# Patient Record
Sex: FEMALE | Race: WHITE | HISPANIC OR LATINO | Employment: FULL TIME | URBAN - METROPOLITAN AREA
[De-identification: names, ages, dates, MRNs, and addresses within clinical notes are randomized per-mention and may not be internally consistent; named-entity substitution may affect disease eponyms.]

---

## 2021-04-23 ENCOUNTER — OCCMED (OUTPATIENT)
Dept: URGENT CARE | Facility: CLINIC | Age: 31
End: 2021-04-23

## 2021-04-23 ENCOUNTER — APPOINTMENT (OUTPATIENT)
Dept: LAB | Facility: CLINIC | Age: 31
End: 2021-04-23

## 2021-04-23 DIAGNOSIS — Z02.1 ENCOUNTER FOR PRE-EMPLOYMENT HEALTH SCREENING EXAMINATION: ICD-10-CM

## 2021-04-23 DIAGNOSIS — Z02.1 ENCOUNTER FOR PRE-EMPLOYMENT HEALTH SCREENING EXAMINATION: Primary | ICD-10-CM

## 2021-04-23 LAB — RUBV IGG SERPL IA-ACNC: 11.6 IU/ML

## 2021-04-23 PROCEDURE — 86480 TB TEST CELL IMMUN MEASURE: CPT

## 2021-04-23 PROCEDURE — 86765 RUBEOLA ANTIBODY: CPT

## 2021-04-23 PROCEDURE — 86762 RUBELLA ANTIBODY: CPT

## 2021-04-23 PROCEDURE — 36415 COLL VENOUS BLD VENIPUNCTURE: CPT

## 2021-04-23 PROCEDURE — 86735 MUMPS ANTIBODY: CPT

## 2021-04-23 PROCEDURE — 86787 VARICELLA-ZOSTER ANTIBODY: CPT

## 2021-04-26 LAB
GAMMA INTERFERON BACKGROUND BLD IA-ACNC: 0.03 IU/ML
M TB IFN-G BLD-IMP: NEGATIVE
M TB IFN-G CD4+ BCKGRND COR BLD-ACNC: 0 IU/ML
M TB IFN-G CD4+ BCKGRND COR BLD-ACNC: 0 IU/ML
MITOGEN IGNF BCKGRD COR BLD-ACNC: >10 IU/ML

## 2021-04-27 LAB
MEV IGG SER QL: NORMAL
MUV IGG SER QL: NORMAL
VZV IGG SER IA-ACNC: NORMAL

## 2021-06-08 ENCOUNTER — APPOINTMENT (EMERGENCY)
Dept: CT IMAGING | Facility: HOSPITAL | Age: 31
End: 2021-06-08
Payer: COMMERCIAL

## 2021-06-08 ENCOUNTER — OFFICE VISIT (OUTPATIENT)
Dept: URGENT CARE | Age: 31
End: 2021-06-08
Payer: COMMERCIAL

## 2021-06-08 ENCOUNTER — HOSPITAL ENCOUNTER (EMERGENCY)
Facility: HOSPITAL | Age: 31
Discharge: HOME | End: 2021-06-08
Attending: EMERGENCY MEDICINE
Payer: COMMERCIAL

## 2021-06-08 VITALS
BODY MASS INDEX: 29.56 KG/M2 | HEART RATE: 67 BPM | DIASTOLIC BLOOD PRESSURE: 73 MMHG | RESPIRATION RATE: 18 BRPM | OXYGEN SATURATION: 100 % | WEIGHT: 161.6 LBS | TEMPERATURE: 98.4 F | SYSTOLIC BLOOD PRESSURE: 106 MMHG

## 2021-06-08 VITALS
OXYGEN SATURATION: 100 % | BODY MASS INDEX: 30 KG/M2 | TEMPERATURE: 98 F | HEART RATE: 74 BPM | WEIGHT: 163 LBS | RESPIRATION RATE: 18 BRPM | HEIGHT: 62 IN

## 2021-06-08 DIAGNOSIS — R10.10 PAIN OF UPPER ABDOMEN: Primary | ICD-10-CM

## 2021-06-08 DIAGNOSIS — R10.32 LEFT LOWER QUADRANT ABDOMINAL PAIN: Primary | ICD-10-CM

## 2021-06-08 LAB
ALBUMIN SERPL BCP-MCNC: 3.9 G/DL (ref 3.5–5)
ALP SERPL-CCNC: 177 U/L (ref 46–116)
ALT SERPL W P-5'-P-CCNC: 33 U/L (ref 12–78)
ANION GAP SERPL CALCULATED.3IONS-SCNC: 7 MMOL/L (ref 4–13)
AST SERPL W P-5'-P-CCNC: 14 U/L (ref 5–45)
BASOPHILS # BLD AUTO: 0.05 THOUSANDS/ΜL (ref 0–0.1)
BASOPHILS NFR BLD AUTO: 0 % (ref 0–1)
BILIRUB SERPL-MCNC: 0.81 MG/DL (ref 0.2–1)
BILIRUB UR QL STRIP: NEGATIVE
BUN SERPL-MCNC: 13 MG/DL (ref 5–25)
CALCIUM SERPL-MCNC: 9.3 MG/DL (ref 8.3–10.1)
CHLORIDE SERPL-SCNC: 104 MMOL/L (ref 100–108)
CLARITY UR: CLEAR
CO2 SERPL-SCNC: 30 MMOL/L (ref 21–32)
COLOR UR: YELLOW
CREAT SERPL-MCNC: 0.75 MG/DL (ref 0.6–1.3)
EOSINOPHIL # BLD AUTO: 0.06 THOUSAND/ΜL (ref 0–0.61)
EOSINOPHIL NFR BLD AUTO: 1 % (ref 0–6)
ERYTHROCYTE [DISTWIDTH] IN BLOOD BY AUTOMATED COUNT: 14.7 % (ref 11.6–15.1)
EXT PREG TEST URINE: NORMAL
EXT. CONTROL ED NAV: NORMAL
GFR SERPL CREATININE-BSD FRML MDRD: 106 ML/MIN/1.73SQ M
GLUCOSE SERPL-MCNC: 88 MG/DL (ref 65–140)
GLUCOSE UR STRIP-MCNC: NEGATIVE MG/DL
HCT VFR BLD AUTO: 41.2 % (ref 34.8–46.1)
HGB BLD-MCNC: 12.9 G/DL (ref 11.5–15.4)
HGB UR QL STRIP.AUTO: NEGATIVE
IMM GRANULOCYTES # BLD AUTO: 0.05 THOUSAND/UL (ref 0–0.2)
IMM GRANULOCYTES NFR BLD AUTO: 0 % (ref 0–2)
KETONES UR STRIP-MCNC: ABNORMAL MG/DL
LEUKOCYTE ESTERASE UR QL STRIP: NEGATIVE
LIPASE SERPL-CCNC: 107 U/L (ref 73–393)
LYMPHOCYTES # BLD AUTO: 2.19 THOUSANDS/ΜL (ref 0.6–4.47)
LYMPHOCYTES NFR BLD AUTO: 19 % (ref 14–44)
MCH RBC QN AUTO: 27.2 PG (ref 26.8–34.3)
MCHC RBC AUTO-ENTMCNC: 31.3 G/DL (ref 31.4–37.4)
MCV RBC AUTO: 87 FL (ref 82–98)
MONOCYTES # BLD AUTO: 0.5 THOUSAND/ΜL (ref 0.17–1.22)
MONOCYTES NFR BLD AUTO: 4 % (ref 4–12)
NEUTROPHILS # BLD AUTO: 8.71 THOUSANDS/ΜL (ref 1.85–7.62)
NEUTS SEG NFR BLD AUTO: 76 % (ref 43–75)
NITRITE UR QL STRIP: NEGATIVE
NRBC BLD AUTO-RTO: 0 /100 WBCS
PH UR STRIP.AUTO: 6 [PH] (ref 4.5–8)
PLATELET # BLD AUTO: 271 THOUSANDS/UL (ref 149–390)
PMV BLD AUTO: 10.5 FL (ref 8.9–12.7)
POTASSIUM SERPL-SCNC: 4.1 MMOL/L (ref 3.5–5.3)
PROT SERPL-MCNC: 8.3 G/DL (ref 6.4–8.2)
PROT UR STRIP-MCNC: NEGATIVE MG/DL
RBC # BLD AUTO: 4.74 MILLION/UL (ref 3.81–5.12)
SODIUM SERPL-SCNC: 141 MMOL/L (ref 136–145)
SP GR UR STRIP.AUTO: 1.02 (ref 1–1.03)
UROBILINOGEN UR QL STRIP.AUTO: 0.2 E.U./DL
WBC # BLD AUTO: 11.56 THOUSAND/UL (ref 4.31–10.16)

## 2021-06-08 PROCEDURE — 81003 URINALYSIS AUTO W/O SCOPE: CPT

## 2021-06-08 PROCEDURE — 99284 EMERGENCY DEPT VISIT MOD MDM: CPT

## 2021-06-08 PROCEDURE — 99284 EMERGENCY DEPT VISIT MOD MDM: CPT | Performed by: EMERGENCY MEDICINE

## 2021-06-08 PROCEDURE — 96361 HYDRATE IV INFUSION ADD-ON: CPT

## 2021-06-08 PROCEDURE — 36415 COLL VENOUS BLD VENIPUNCTURE: CPT | Performed by: EMERGENCY MEDICINE

## 2021-06-08 PROCEDURE — 74177 CT ABD & PELVIS W/CONTRAST: CPT

## 2021-06-08 PROCEDURE — 81025 URINE PREGNANCY TEST: CPT | Performed by: EMERGENCY MEDICINE

## 2021-06-08 PROCEDURE — 80053 COMPREHEN METABOLIC PANEL: CPT | Performed by: EMERGENCY MEDICINE

## 2021-06-08 PROCEDURE — G0382 LEV 3 HOSP TYPE B ED VISIT: HCPCS | Performed by: PHYSICIAN ASSISTANT

## 2021-06-08 PROCEDURE — 83690 ASSAY OF LIPASE: CPT | Performed by: EMERGENCY MEDICINE

## 2021-06-08 PROCEDURE — 85025 COMPLETE CBC W/AUTO DIFF WBC: CPT | Performed by: EMERGENCY MEDICINE

## 2021-06-08 PROCEDURE — 96374 THER/PROPH/DIAG INJ IV PUSH: CPT

## 2021-06-08 RX ORDER — ACETAMINOPHEN AND CODEINE PHOSPHATE 120; 12 MG/5ML; MG/5ML
0.35 SOLUTION ORAL DAILY
COMMUNITY
Start: 2020-11-13 | End: 2022-04-29

## 2021-06-08 RX ORDER — KETOROLAC TROMETHAMINE 30 MG/ML
15 INJECTION, SOLUTION INTRAMUSCULAR; INTRAVENOUS ONCE
Status: COMPLETED | OUTPATIENT
Start: 2021-06-08 | End: 2021-06-08

## 2021-06-08 RX ORDER — DICYCLOMINE HYDROCHLORIDE 10 MG/1
10 CAPSULE ORAL ONCE
Status: COMPLETED | OUTPATIENT
Start: 2021-06-08 | End: 2021-06-08

## 2021-06-08 RX ADMIN — DICYCLOMINE HYDROCHLORIDE 10 MG: 10 CAPSULE ORAL at 16:24

## 2021-06-08 RX ADMIN — SODIUM CHLORIDE 1000 ML: 0.9 INJECTION, SOLUTION INTRAVENOUS at 16:25

## 2021-06-08 RX ADMIN — IOHEXOL 100 ML: 350 INJECTION, SOLUTION INTRAVENOUS at 17:15

## 2021-06-08 RX ADMIN — KETOROLAC TROMETHAMINE 15 MG: 30 INJECTION, SOLUTION INTRAMUSCULAR; INTRAVENOUS at 16:24

## 2021-06-08 NOTE — Clinical Note
Anamyesha Potter was seen and treated in our emergency department on 6/8/2021  Diagnosis:     Janelle Leal    She may return on this date: 06/09/2021         If you have any questions or concerns, please don't hesitate to call        Chucky Morales MD    ______________________________           _______________          _______________  Hospital Representative                              Date                                Time

## 2021-06-08 NOTE — PROGRESS NOTES
[address] [facility]        NAME: Evelin Seymour is a 27 y o  female  : 1990    MRN: [id number]  DATE: 2021  TIME: 3:18 PM    Assessment and Plan   Left lower quadrant abdominal pain [R10 32]  1  Left lower quadrant abdominal pain  Transfer to other facility   Pt with severe epigastric pain and generalized tenderness worse to right lower quadrant  Discussed with patient that I would recommend further evaluation in the emergency department  Pt understands  Her VSS are stable and though she is anxious is in no acute distress and is non-toxic, safe to drive self to ED  Pt would prefer [facility]  Transfer orders placed  Patient Instructions     Proceed to the emergency department for further evaluation  Chief Complaint     Chief Complaint   Patient presents with    Abdominal Pain     pt started about 3 weeks ago with nausea and loose stools  then it went away  pt states the same thing happenend earlier today while at work  she made herself vomit and then she felt better  History of Present Illness       27year old female presents with severe epigastric pain and diarrhea  Pt reports stabbing pain in her epigastric area that began this morning, she has also had diarrhea since that time  Pt reports symptoms were improved when she self-induced vomiting  She states before inducing vomiting her abdomen was very tight and distended  Pt had similar symptoms 3 weeks ago and reports at that time she was at work and a coworker commented that she appeared very pale and sweaty  Pt denies fever, chills  She has gallbladder and appendix present  Denies possibility of pregnancy, reports C-sections previously  No other concerns or complaints today  Review of Systems   Review of Systems   Constitutional: Positive for diaphoresis  Negative for chills and fever  Gastrointestinal: Positive for abdominal distention, abdominal pain, anal bleeding and diarrhea  Negative for blood in stool  Current Medications       Current Outpatient Medications:     norethindrone (MICRONOR) 0 35 MG tablet, Take 0 35 mg by mouth daily, Disp: , Rfl:     Current Allergies     Allergies as of 06/08/2021    (No Known Allergies)            The following portions of the patient's history were reviewed and updated as appropriate: allergies, current medications, past family history, past medical history, past social history, past surgical history and problem list      No past medical history on file  No past surgical history on file  No family history on file  Medications have been verified  Objective   Pulse 74   Temp 98 °F (36 7 °C)   Resp 18   Ht 5' 2" (1 575 m)   Wt 73 9 kg (163 lb)   SpO2 100%   BMI 29 81 kg/m²   No LMP recorded  Physical Exam     Physical Exam  Vitals signs and nursing note reviewed  Constitutional:       General: She is awake  She is not in acute distress  Appearance: Normal appearance  She is well-developed and well-groomed  She is not ill-appearing, toxic-appearing or diaphoretic  HENT:      Head: Normocephalic and atraumatic  Right Ear: Hearing and external ear normal       Left Ear: Hearing and external ear normal    Eyes:      General: Lids are normal  Vision grossly intact  Gaze aligned appropriately  Neck:      Musculoskeletal: Normal range of motion  Cardiovascular:      Rate and Rhythm: Normal rate and regular rhythm  Heart sounds: Normal heart sounds, S1 normal and S2 normal  Heart sounds not distant  No murmur  No friction rub  No gallop  Pulmonary:      Effort: Pulmonary effort is normal       Breath sounds: Normal breath sounds  No decreased breath sounds, wheezing, rhonchi or rales  Comments: Patient is speaking in full sentences with no increased respiratory effort  No audible wheezing or stridor  Abdominal:      General: Abdomen is flat  Bowel sounds are normal       Palpations: Abdomen is soft        Tenderness: There is generalized abdominal tenderness (worst to RLQ)  There is guarding  There is no rebound  Positive signs include McBurney's sign  Negative signs include Potter's sign, Rovsing's sign, psoas sign and obturator sign  Skin:     General: Skin is warm and dry  Neurological:      Mental Status: She is alert and oriented to person, place, and time  Coordination: Coordination is intact  Gait: Gait is intact  Psychiatric:         Attention and Perception: Attention and perception normal          Mood and Affect: Mood and affect normal          Speech: Speech normal          Behavior: Behavior normal  Behavior is cooperative

## 2021-06-08 NOTE — ED PROVIDER NOTES
Final Diagnosis:  1  Pain of upper abdomen        Chief Complaint   Patient presents with    Abdominal Pain     Worsening RLQ pain  Loose stools, n/v  Sent by Urgent Care for further evaluation  Denies taking anything for pain  HPI  Patient presents for evaluation of abdominal pain  Patient states that earlier today at approximately 8:00 a m  She had the sudden onset of epigastric abdominal pain  She then had multiple episodes of loose stools, nonbloody  She also self induced vomiting and then had many episodes of vomiting which were nonbloody, stomach contents  This improved her pain slightly  She then presented to an outside facility for evaluation  There on exam she had epigastric and right lower quadrant abdominal pain  She was then sent into the emergency department for evaluation of possible appendicitis  Patient is otherwise healthy and takes no medications  She states that she had a similar episode approximately 3 weeks ago when she had the onset of epigastric pain  She stated that she felt hot and sweaty when this occurred  She had multiple episodes of loose stools as well and the feeling subsided over 4-6 hours  Denies any history of abdominal surgery  She did have a  section last July  No fever chills, nausea or vomiting, change in bowel habits, dysuria prior  Unless otherwise stated - No exacerbating or relieving factors, no radiation of pain to back/chest/legs  Able to tolerate PO  Still passing gas/stools  No recent travel or sick contacts  Denies nausea/vomiting, constipation, diarrhea, dysuria, hematuria, melana, or dark colored stools  No pulsatile abdominal mass to suggest AAA  No Point RLQ tenderness to suggest appy  No pain out of proportion to suggest ischemic colitis  No reported chest pain, pleuritic chest pain or shortness of breath  No reported pulmonary symptoms  No tachypnea  No suprapubic or CVA tenderness  No fever/ruq pain/jaundice  Unless otherwise stated - No exacerbating or relieving factors, no radiation of pain to back/chest/legs  Able to tolerate PO  Still passing gas/stools  No recent travel or sick contacts  Denies nausea/vomiting, constipation, diarrhea, dysuria, hematuria, melana, or dark colored stools  No pulsatile abdominal mass to suggest AAA  No Point RLQ tenderness to suggest appy  No pain out of proportion to suggest ischemic colitis  No reported chest pain, pleuritic chest pain or shortness of breath  No reported pulmonary symptoms  No tachypnea  No suprapubic or CVA tenderness  No fever/ruq pain/jaundice        - No language barrier    - History obtained from patient  - There are no limitations to the history obtained  - Previous charting was reviewed    PMH:   has no past medical history on file  PSH:   has no past surgical history on file  ROS:  Review of Systems   Constitutional: Negative for chills, diaphoresis, fatigue and fever  Respiratory: Negative for cough and shortness of breath  Cardiovascular: Negative for chest pain and palpitations  Gastrointestinal: Positive for abdominal pain, diarrhea and vomiting  Negative for abdominal distention, constipation and nausea  Genitourinary: Negative for dysuria, frequency and hematuria  Musculoskeletal: Negative for arthralgias, myalgias and neck pain  Neurological: Negative for dizziness, syncope, light-headedness and headaches  All other systems reviewed and are negative  PE:   Vitals:    06/08/21 1543 06/08/21 1545 06/08/21 1647   BP:  142/67 106/73   BP Location:  Right arm Left arm   Pulse:  78 67   Resp:  18 18   Temp:  98 4 °F (36 9 °C)    TempSrc:  Oral    SpO2:  99% 100%   Weight: 73 3 kg (161 lb 9 6 oz)       Vitals reviewed by me  Physical Exam  Vitals signs reviewed  Constitutional:       General: She is not in acute distress  Appearance: She is not diaphoretic     HENT:      Head: Normocephalic and atraumatic  Right Ear: External ear normal       Left Ear: External ear normal    Eyes:      General: No scleral icterus  Right eye: No discharge  Left eye: No discharge  Conjunctiva/sclera: Conjunctivae normal       Pupils: Pupils are equal, round, and reactive to light  Neck:      Musculoskeletal: Normal range of motion and neck supple  Vascular: No JVD  Cardiovascular:      Rate and Rhythm: Normal rate and regular rhythm  Heart sounds: Normal heart sounds  No murmur  No friction rub  No gallop  Pulmonary:      Effort: Pulmonary effort is normal  No respiratory distress  Breath sounds: Normal breath sounds  No wheezing or rales  Abdominal:      General: Bowel sounds are normal  There is no distension  Palpations: Abdomen is soft  There is no mass  Tenderness: There is abdominal tenderness in the right lower quadrant and epigastric area  There is no right CVA tenderness, left CVA tenderness, guarding or rebound  Negative signs include Potter's sign  Musculoskeletal: Normal range of motion  General: No tenderness or deformity  Skin:     General: Skin is warm  Neurological:      Mental Status: She is alert and oriented to person, place, and time  Cranial Nerves: No cranial nerve deficit  Sensory: No sensory deficit  Motor: No abnormal muscle tone  Coordination: Coordination normal    Psychiatric:         Behavior: Behavior normal          Thought Content: Thought content normal          Judgment: Judgment normal           A:  - Nursing note reviewed  -                      CT Abdomen pelvis with contrast   Final Result      No acute inflammatory changes in the abdomen or pelvis              Workstation performed: [id number]           Orders Placed This Encounter   Procedures    CT Abdomen pelvis with contrast    CBC and differential    CMP    Lipase    Ambulatory referral to Gastroenterology    Urine dip analyzer  Insert peripheral IV    POCT pregnancy, urine     Labs Reviewed   CBC AND DIFFERENTIAL - Abnormal       Result Value Ref Range Status    WBC 11 56 (*) 4 31 - 10 16 Thousand/uL Final    RBC 4 74  3 81 - 5 12 Million/uL Final    Hemoglobin 12 9  11 5 - 15 4 g/dL Final    Hematocrit 41 2  34 8 - 46 1 % Final    MCV 87  82 - 98 fL Final    MCH 27 2  26 8 - 34 3 pg Final    MCHC 31 3 (*) 31 4 - 37 4 g/dL Final    RDW 14 7  11 6 - 15 1 % Final    MPV 10 5  8 9 - 12 7 fL Final    Platelets 976  559 - 390 Thousands/uL Final    nRBC 0  /100 WBCs Final    Neutrophils Relative 76 (*) 43 - 75 % Final    Immat GRANS % 0  0 - 2 % Final    Lymphocytes Relative 19  14 - 44 % Final    Monocytes Relative 4  4 - 12 % Final    Eosinophils Relative 1  0 - 6 % Final    Basophils Relative 0  0 - 1 % Final    Neutrophils Absolute 8 71 (*) 1 85 - 7 62 Thousands/µL Final    Immature Grans Absolute 0 05  0 00 - 0 20 Thousand/uL Final    Lymphocytes Absolute 2 19  0 60 - 4 47 Thousands/µL Final    Monocytes Absolute 0 50  0 17 - 1 22 Thousand/µL Final    Eosinophils Absolute 0 06  0 00 - 0 61 Thousand/µL Final    Basophils Absolute 0 05  0 00 - 0 10 Thousands/µL Final   COMPREHENSIVE METABOLIC PANEL - Abnormal    Sodium 141  136 - 145 mmol/L Final    Potassium 4 1  3 5 - 5 3 mmol/L Final    Chloride 104  100 - 108 mmol/L Final    CO2 30  21 - 32 mmol/L Final    ANION GAP 7  4 - 13 mmol/L Final    BUN 13  5 - 25 mg/dL Final    Creatinine 0 75  0 60 - 1 30 mg/dL Final    Comment: Standardized to IDMS reference method    Glucose 88  65 - 140 mg/dL Final    Comment: If the patient is fasting, the ADA then defines impaired fasting glucose as > 100 mg/dL and diabetes as > or equal to 123 mg/dL  Specimen collection should occur prior to Sulfasalazine administration due to the potential for falsely depressed results  Specimen collection should occur prior to Sulfapyridine administration due to the potential for falsely elevated results  Calcium 9 3  8 3 - 10 1 mg/dL Final    AST 14  5 - 45 U/L Final    Comment: Specimen collection should occur prior to Sulfasalazine administration due to the potential for falsely depressed results  ALT 33  12 - 78 U/L Final    Comment: Specimen collection should occur prior to Sulfasalazine administration due to the potential for falsely depressed results  Alkaline Phosphatase 177 (*) 46 - 116 U/L Final    Total Protein 8 3 (*) 6 4 - 8 2 g/dL Final    Albumin 3 9  3 5 - 5 0 g/dL Final    Total Bilirubin 0 81  0 20 - 1 00 mg/dL Final    Comment: Use of this assay is not recommended for patients undergoing treatment with eltrombopag due to the potential for falsely elevated results      eGFR 106  ml/min/1 73sq m Final    Narrative:     National Kidney Disease Foundation guidelines for Chronic Kidney Disease (CKD):     Stage 1 with normal or high GFR (GFR > 90 mL/min/1 73 square meters)    Stage 2 Mild CKD (GFR = 60-89 mL/min/1 73 square meters)    Stage 3A Moderate CKD (GFR = 45-59 mL/min/1 73 square meters)    Stage 3B Moderate CKD (GFR = 30-44 mL/min/1 73 square meters)    Stage 4 Severe CKD (GFR = 15-29 mL/min/1 73 square meters)    Stage 5 End Stage CKD (GFR <15 mL/min/1 73 square meters)  Note: GFR calculation is accurate only with a steady state creatinine   URINE MACROSCOPIC, POC - Abnormal    Color, UA Yellow   Final    Clarity, UA Clear   Final    pH, UA 6 0  4 5 - 8 0 Final    Leukocytes, UA Negative  Negative Final    Nitrite, UA Negative  Negative Final    Protein, UA Negative  Negative mg/dl Final    Glucose, UA Negative  Negative mg/dl Final    Ketones, UA 15 (1+) (*) Negative mg/dl Final    Urobilinogen, UA 0 2  0 2, 1 0 E U /dl E U /dl Final    Bilirubin, UA Negative  Negative Final    Blood, UA Negative  Negative Final    Specific Gravity, UA 1 025  1 003 - 1 030 Final    Narrative:     CLINITEK RESULT   LIPASE - Normal    Lipase 107  73 - 393 u/L Final   POCT PREGNANCY, URINE - Normal EXT PREG TEST UR (Ref: Negative) Negative (-)   Final    Control Valid   Final         Final Diagnosis:  1  Pain of upper abdomen        P:  - CBC, CMP, lipase, CT scan of abdomen pelvis  -no acute findings on laboratory analysis or on imaging  -I discussed with patient that she should follow up with primary care provision and may need to see gastroenterology for further evaluation of her symptoms should they continue to recur  Provided information for both  Return precautions discussed  Medications   dicyclomine (BENTYL) capsule 10 mg (10 mg Oral Given 6/8/21 1624)   ketorolac (TORADOL) injection 15 mg (15 mg Intravenous Given 6/8/21 1624)   sodium chloride 0 9 % bolus 1,000 mL (0 mL Intravenous Stopped 6/8/21 1736)   iohexol (OMNIPAQUE) 350 MG/ML injection (SINGLE-DOSE) 100 mL (100 mL Intravenous Given 6/8/21 1715)     Time reflects when diagnosis was documented in both MDM as applicable and the Disposition within this note     Time User Action Codes Description Comment    6/8/2021  5:31 PM Lala White [facility] Add [R10 10] Pain of upper abdomen       ED Disposition     ED Disposition Condition Date/Time Comment    Discharge Stable Tue Jun 8, 2021  5:31 PM Flor Potter discharge to home/self care  Follow-up Information     Follow up With Specialties Details Why Contact Info    [employer]   Establish [phone]          Patient's Medications   Discharge Prescriptions    No medications on file       Prior to Admission Medications   Prescriptions Last Dose Informant Patient Reported? Taking?   norethindrone (MICRONOR) 0 35 MG tablet   Yes No   Sig: Take 0 35 mg by mouth daily      Facility-Administered Medications: None       Portions of the record may have been created with voice recognition software  Occasional wrong word or "sound a like" substitutions may have occurred due to the inherent limitations of voice recognition software   Read the chart carefully and recognize, using context, where substitutions have occurred      Electronically signed by:  Collette Restrepo, PGY 3, MD Gaudencio Muller MD  06/08/21 0899

## 2021-06-08 NOTE — ED ATTENDING ATTESTATION
2021  I, Paty Emerson MD, saw and evaluated the patient  I have discussed the patient with the resident/non-physician practitioner and agree with the resident's/non-physician practitioner's findings, Plan of Care, and MDM as documented in the resident's/non-physician practitioner's note, except where noted  All available labs and Radiology studies were reviewed  I was present for key portions of any procedure(s) performed by the resident/non-physician practitioner and I was immediately available to provide assistance  At this point I agree with the current assessment done in the Emergency Department  I have conducted an independent evaluation of this patient a history and physical is as follows:  28 yo female referred to ED from urgent care  for further evaluation of RLQ abdominal pain, associated with N/V/D  No fever  [facility] documentation lists both RLQ and LLQ as the concern, but sorting out the details, seems RLQ is the concern  VS NAD  Abd soft, with more generalized discomfort, epigastric, RUQ, and RLQ, no distress, no peritoneal signs  ED workup with fluids, labs, CT imaging  Disposition according to findings and response to treatment  Diagnosis after imagin  Epigastric abdominal pain  2    RLQ abdominal pain      ED Course         Critical Care Time  Procedures

## 2021-07-01 ENCOUNTER — TELEPHONE (OUTPATIENT)
Dept: GASTROENTEROLOGY | Facility: MEDICAL CENTER | Age: 31
End: 2021-07-01

## 2021-08-06 ENCOUNTER — OFFICE VISIT (OUTPATIENT)
Dept: INTERNAL MEDICINE CLINIC | Facility: CLINIC | Age: 31
End: 2021-08-06
Payer: COMMERCIAL

## 2021-08-06 VITALS
WEIGHT: 163 LBS | BODY MASS INDEX: 30 KG/M2 | DIASTOLIC BLOOD PRESSURE: 80 MMHG | OXYGEN SATURATION: 98 % | TEMPERATURE: 98.4 F | HEIGHT: 62 IN | HEART RATE: 87 BPM | SYSTOLIC BLOOD PRESSURE: 122 MMHG

## 2021-08-06 DIAGNOSIS — R10.2 PELVIC PAIN: ICD-10-CM

## 2021-08-06 DIAGNOSIS — E04.9 ENLARGED THYROID: ICD-10-CM

## 2021-08-06 DIAGNOSIS — F41.8 SITUATIONAL ANXIETY: ICD-10-CM

## 2021-08-06 DIAGNOSIS — G89.29 CHRONIC BILATERAL LOW BACK PAIN WITHOUT SCIATICA: ICD-10-CM

## 2021-08-06 DIAGNOSIS — F32.A DEPRESSION, UNSPECIFIED DEPRESSION TYPE: Primary | ICD-10-CM

## 2021-08-06 DIAGNOSIS — R79.89 ABNORMAL CBC: ICD-10-CM

## 2021-08-06 DIAGNOSIS — E28.2 POLYCYSTIC OVARIAN SYNDROME: ICD-10-CM

## 2021-08-06 DIAGNOSIS — Z13.220 SCREENING FOR LIPID DISORDERS: ICD-10-CM

## 2021-08-06 DIAGNOSIS — E55.9 VITAMIN D DEFICIENCY: ICD-10-CM

## 2021-08-06 DIAGNOSIS — Z76.89 ENCOUNTER TO ESTABLISH CARE: ICD-10-CM

## 2021-08-06 DIAGNOSIS — R74.8 ELEVATED ALKALINE PHOSPHATASE LEVEL: ICD-10-CM

## 2021-08-06 DIAGNOSIS — M54.50 CHRONIC BILATERAL LOW BACK PAIN WITHOUT SCIATICA: ICD-10-CM

## 2021-08-06 DIAGNOSIS — K59.09 CHRONIC CONSTIPATION: ICD-10-CM

## 2021-08-06 PROBLEM — U07.1 COVID-19 VIRUS DETECTED: Status: ACTIVE | Noted: 2020-06-19

## 2021-08-06 PROBLEM — A60.00 HERPES GENITALIA: Status: ACTIVE | Noted: 2017-01-25

## 2021-08-06 PROBLEM — K58.2 IRRITABLE BOWEL SYNDROME WITH BOTH CONSTIPATION AND DIARRHEA: Status: ACTIVE | Noted: 2017-03-09

## 2021-08-06 PROBLEM — B00.2 ORAL HERPES: Status: ACTIVE | Noted: 2019-12-09

## 2021-08-06 PROBLEM — A60.00 HERPES GENITALIA: Status: RESOLVED | Noted: 2017-01-25 | Resolved: 2021-08-06

## 2021-08-06 PROBLEM — L68.0 FEMALE HIRSUTISM: Status: ACTIVE | Noted: 2018-05-31

## 2021-08-06 PROBLEM — O09.299 HX OF MATERNAL LACERATION, 4TH DEGREE, CURRENTLY PREGNANT: Status: ACTIVE | Noted: 2019-12-09

## 2021-08-06 PROCEDURE — 99204 OFFICE O/P NEW MOD 45 MIN: CPT | Performed by: NURSE PRACTITIONER

## 2021-08-06 RX ORDER — BIOTIN 1000 MCG
1000 TABLET,CHEWABLE ORAL
COMMUNITY
End: 2022-04-29 | Stop reason: ALTCHOICE

## 2021-08-06 RX ORDER — UREA 10 %
500 LOTION (ML) TOPICAL DAILY
COMMUNITY
End: 2022-04-29 | Stop reason: ALTCHOICE

## 2021-08-06 NOTE — PATIENT INSTRUCTIONS
Start sertraline 1/2 tablet daily for 1-2 weeks, then increase to full tablet   Go for fasting labs   Go for thyroid US    Follow up with gynecology and gastro

## 2021-08-06 NOTE — PROGRESS NOTES
Assessment/Plan:    Problem List Items Addressed This Visit        Digestive    Chronic constipation     GI follow up scheduled for next week             Endocrine    Polycystic ovarian syndrome     Follow up with [facility]  Referral given         Relevant Orders    Ambulatory referral to Obstetrics / Gynecology    Enlarged thyroid     Check thyroid US and TSH         Relevant Orders    US thyroid       Other    Depression - Primary     Start sertraline 50mg (1/2 tab x 1 week then increase to full tab)  Follow up in 1 month         Relevant Medications    sertraline (ZOLOFT) 50 mg tablet    Other Relevant Orders    TSH, 3rd generation with Free T4 reflex    Situational anxiety     Start sertraline 50mg daily          Relevant Medications    sertraline (ZOLOFT) 50 mg tablet    Other Relevant Orders    TSH, 3rd generation with Free T4 reflex    Vitamin D deficiency     Update vitamin D level         Relevant Orders    Vitamin D 25 hydroxy    Chronic bilateral low back pain without sciatica     Start physical therapy         Relevant Orders    Ambulatory referral to Physical Therapy    Pelvic pain    Relevant Orders    Ambulatory referral to Physical Therapy      Other Visit Diagnoses     Elevated alkaline phosphatase level        Relevant Orders    Comprehensive metabolic panel    Abnormal CBC        Relevant Orders    CBC and differential    Screening for lipid disorders        Relevant Orders    Lipid Panel with Direct LDL reflex    Encounter to establish care              M*Modal software was used to dictate this note  It may contain errors with dictating incorrect words or incorrect spelling  Please contact the provider directly with any questions  Subjective:      Patient ID: Jill Mckeon is a 27 y o  female  HPI    Patient presents today to establish care with our office  She was previously following with a PCP with [facility] but got a new job with [employer]  She works in physician billing     She has 2 children, oldest is nine and her youngest is 12 months  She is currently breastfeeding  She is trying to wean from breastfeeding  Medical conditions include:    PCOS - she has frequent menses with heavy menstrual cycles  , worsening depression, mood swings, fatigue,anxiety  She was following with a gynecologist with [facility] but states they only recommended birth control and she feels her weight gain and mood swings are worse with the birth control  Anxiety/depression - worse around her menstrual cycle  She did have PPD after this last pregnancy  She was not on any medications  She was seeing a counselor for about 3 appointments virtually  She did not see a significant benefit  In the past she was on zoloft and xanax  She felt the zoloft did improver her moods but did make her very tired  Some days very unmotivated     Ever since her C section very constipated and painful, difficulty with passing flatus and having bowel movements  She states that she does have a hx of IBS with mixed constipation and diarrhea and also endorses and hx of chronic constipation she is scheduled to follow up with [facility] next week    Back pain - low back pain since her having her c section  She sits at a desk all day and thinks her pain is also from a weak core  Pain is midline and bilateral, occasionally with briefly radiate into her posterior thigh  The following portions of the patient's history were reviewed and updated as appropriate: allergies, current medications, past family history, past medical history, past social history, past surgical history and problem list     Review of Systems   Constitutional: Negative for chills and fever  Gastrointestinal: Positive for abdominal pain and constipation  Psychiatric/Behavioral: Positive for dysphoric mood  Negative for self-injury and suicidal ideas  The patient is nervous/anxious            Past Medical History:   Diagnosis Date    Anemia     Anxiety     Asthma     Depression     Diabetes mellitus ([facility] [address] )     Gestational DM         Current Outpatient Medications:     Biotin 1000 MCG CHEW, Chew 1,000 mcg, Disp: , Rfl:     Calcium 250 MG CAPS, Take 250 mg by mouth daily, Disp: , Rfl:     magnesium gluconate (MAGONATE) 500 mg tablet, Take 500 mg by mouth daily, Disp: , Rfl:     other medication, see sig,, Medication/product name: Ever-inositol / d-chiro- inositol   Strength: 2000 mg/50 mg   Sig (include dose, route, frequency): Take 2 capsules daily, Disp: , Rfl:     norethindrone (MICRONOR) 0 35 MG tablet, Take 0 35 mg by mouth daily (Patient not taking: Reported on 2021), Disp: , Rfl:     sertraline (ZOLOFT) 50 mg tablet, Take 1 tablet (50 mg total) by mouth daily, Disp: 30 tablet, Rfl: 0    No Known Allergies    Social History   Past Surgical History:   Procedure Laterality Date     SECTION  2020    WISDOM TOOTH EXTRACTION       Family History   Problem Relation Age of Onset    Diabetes Maternal Grandmother        Objective:  /80 (BP Location: Left arm, Patient Position: Sitting, Cuff Size: Adult)   Pulse 87   Temp 98 4 °F (36 9 °C)   Ht 5' 2" (1 575 m)   Wt 73 9 kg (163 lb)   SpO2 98%   BMI 29 81 kg/m²      Physical Exam  Vitals reviewed  Constitutional:       General: She is not in acute distress  Appearance: Normal appearance  She is not diaphoretic  HENT:      Head: Normocephalic and atraumatic  Right Ear: Tympanic membrane and external ear normal       Left Ear: Tympanic membrane and external ear normal       Nose: Nose normal       Mouth/Throat:      Mouth: Mucous membranes are moist       Pharynx: Oropharynx is clear  No oropharyngeal exudate or posterior oropharyngeal erythema  Eyes:      Extraocular Movements: Extraocular movements intact  Conjunctiva/sclera: Conjunctivae normal       Pupils: Pupils are equal, round, and reactive to light  Neck:      Thyroid: Thyromegaly present  No thyroid tenderness  Cardiovascular:      Rate and Rhythm: Normal rate and regular rhythm  Heart sounds: Normal heart sounds  No murmur heard  Pulmonary:      Effort: Pulmonary effort is normal  No respiratory distress  Breath sounds: Normal breath sounds  No wheezing, rhonchi or rales  Abdominal:      General: Bowel sounds are normal  There is no distension  Palpations: Abdomen is soft  Tenderness: There is abdominal tenderness (generalized lower abdomen)  Comments: Transverse C section scar in lower abdomen with keloid   Musculoskeletal:      Lumbar back: Tenderness and bony tenderness present  Right lower leg: No edema  Left lower leg: No edema  Neurological:      Mental Status: She is alert and oriented to person, place, and time  Mental status is at baseline     Psychiatric:         Mood and Affect: Mood normal          Behavior: Behavior normal

## 2021-08-12 ENCOUNTER — HOSPITAL ENCOUNTER (OUTPATIENT)
Dept: RADIOLOGY | Facility: IMAGING CENTER | Age: 31
Discharge: HOME | End: 2021-08-12
Payer: COMMERCIAL

## 2021-08-12 ENCOUNTER — EVALUATION (OUTPATIENT)
Dept: PHYSICAL THERAPY | Facility: REHABILITATION | Age: 31
End: 2021-08-12
Payer: COMMERCIAL

## 2021-08-12 ENCOUNTER — APPOINTMENT (OUTPATIENT)
Dept: LAB | Facility: IMAGING CENTER | Age: 31
End: 2021-08-12
Payer: COMMERCIAL

## 2021-08-12 DIAGNOSIS — R74.8 ELEVATED ALKALINE PHOSPHATASE LEVEL: ICD-10-CM

## 2021-08-12 DIAGNOSIS — Z13.220 SCREENING FOR LIPID DISORDERS: ICD-10-CM

## 2021-08-12 DIAGNOSIS — R10.2 PELVIC PAIN: ICD-10-CM

## 2021-08-12 DIAGNOSIS — E55.9 VITAMIN D DEFICIENCY: ICD-10-CM

## 2021-08-12 DIAGNOSIS — R79.89 ABNORMAL CBC: ICD-10-CM

## 2021-08-12 DIAGNOSIS — F41.8 SITUATIONAL ANXIETY: ICD-10-CM

## 2021-08-12 DIAGNOSIS — G89.29 CHRONIC BILATERAL LOW BACK PAIN WITHOUT SCIATICA: Primary | ICD-10-CM

## 2021-08-12 DIAGNOSIS — F32.A DEPRESSION, UNSPECIFIED DEPRESSION TYPE: ICD-10-CM

## 2021-08-12 DIAGNOSIS — M54.50 CHRONIC BILATERAL LOW BACK PAIN WITHOUT SCIATICA: Primary | ICD-10-CM

## 2021-08-12 DIAGNOSIS — E04.9 ENLARGED THYROID: ICD-10-CM

## 2021-08-12 LAB
25(OH)D3 SERPL-MCNC: 26.3 NG/ML (ref 30–100)
ALBUMIN SERPL BCP-MCNC: 3.7 G/DL (ref 3.5–5)
ALP SERPL-CCNC: 123 U/L (ref 46–116)
ALT SERPL W P-5'-P-CCNC: 18 U/L (ref 12–78)
ANION GAP SERPL CALCULATED.3IONS-SCNC: 5 MMOL/L (ref 4–13)
AST SERPL W P-5'-P-CCNC: 11 U/L (ref 5–45)
BASOPHILS # BLD AUTO: 0.04 THOUSANDS/ΜL (ref 0–0.1)
BASOPHILS NFR BLD AUTO: 1 % (ref 0–1)
BILIRUB SERPL-MCNC: 0.57 MG/DL (ref 0.2–1)
BUN SERPL-MCNC: 11 MG/DL (ref 5–25)
CALCIUM SERPL-MCNC: 9 MG/DL (ref 8.3–10.1)
CHLORIDE SERPL-SCNC: 106 MMOL/L (ref 100–108)
CHOLEST SERPL-MCNC: 137 MG/DL (ref 50–200)
CO2 SERPL-SCNC: 27 MMOL/L (ref 21–32)
CREAT SERPL-MCNC: 0.7 MG/DL (ref 0.6–1.3)
EOSINOPHIL # BLD AUTO: 0.07 THOUSAND/ΜL (ref 0–0.61)
EOSINOPHIL NFR BLD AUTO: 1 % (ref 0–6)
ERYTHROCYTE [DISTWIDTH] IN BLOOD BY AUTOMATED COUNT: 15.2 % (ref 11.6–15.1)
GFR SERPL CREATININE-BSD FRML MDRD: 115 ML/MIN/1.73SQ M
GLUCOSE P FAST SERPL-MCNC: 76 MG/DL (ref 65–99)
HCT VFR BLD AUTO: 40.1 % (ref 34.8–46.1)
HDLC SERPL-MCNC: 43 MG/DL
HGB BLD-MCNC: 12.2 G/DL (ref 11.5–15.4)
IMM GRANULOCYTES # BLD AUTO: 0.03 THOUSAND/UL (ref 0–0.2)
IMM GRANULOCYTES NFR BLD AUTO: 0 % (ref 0–2)
LDLC SERPL CALC-MCNC: 79 MG/DL (ref 0–100)
LYMPHOCYTES # BLD AUTO: 2.02 THOUSANDS/ΜL (ref 0.6–4.47)
LYMPHOCYTES NFR BLD AUTO: 26 % (ref 14–44)
MCH RBC QN AUTO: 27.7 PG (ref 26.8–34.3)
MCHC RBC AUTO-ENTMCNC: 30.4 G/DL (ref 31.4–37.4)
MCV RBC AUTO: 91 FL (ref 82–98)
MONOCYTES # BLD AUTO: 0.35 THOUSAND/ΜL (ref 0.17–1.22)
MONOCYTES NFR BLD AUTO: 5 % (ref 4–12)
NEUTROPHILS # BLD AUTO: 5.32 THOUSANDS/ΜL (ref 1.85–7.62)
NEUTS SEG NFR BLD AUTO: 67 % (ref 43–75)
NRBC BLD AUTO-RTO: 0 /100 WBCS
PLATELET # BLD AUTO: 270 THOUSANDS/UL (ref 149–390)
PMV BLD AUTO: 11.4 FL (ref 8.9–12.7)
POTASSIUM SERPL-SCNC: 4.5 MMOL/L (ref 3.5–5.3)
PROT SERPL-MCNC: 7.9 G/DL (ref 6.4–8.2)
RBC # BLD AUTO: 4.41 MILLION/UL (ref 3.81–5.12)
SODIUM SERPL-SCNC: 138 MMOL/L (ref 136–145)
TRIGL SERPL-MCNC: 75 MG/DL
TSH SERPL DL<=0.05 MIU/L-ACNC: 1.18 UIU/ML (ref 0.36–3.74)
WBC # BLD AUTO: 7.83 THOUSAND/UL (ref 4.31–10.16)

## 2021-08-12 PROCEDURE — 97161 PT EVAL LOW COMPLEX 20 MIN: CPT | Performed by: PHYSICAL THERAPIST

## 2021-08-12 PROCEDURE — 80061 LIPID PANEL: CPT

## 2021-08-12 PROCEDURE — 85025 COMPLETE CBC W/AUTO DIFF WBC: CPT

## 2021-08-12 PROCEDURE — 97112 NEUROMUSCULAR REEDUCATION: CPT | Performed by: PHYSICAL THERAPIST

## 2021-08-12 PROCEDURE — 82306 VITAMIN D 25 HYDROXY: CPT

## 2021-08-12 PROCEDURE — 80053 COMPREHEN METABOLIC PANEL: CPT

## 2021-08-12 PROCEDURE — 76536 US EXAM OF HEAD AND NECK: CPT

## 2021-08-12 PROCEDURE — 36415 COLL VENOUS BLD VENIPUNCTURE: CPT

## 2021-08-12 PROCEDURE — 84443 ASSAY THYROID STIM HORMONE: CPT

## 2021-08-12 NOTE — PROGRESS NOTES
PT Evaluation     Today's date: 2021  Patient name: Bridgett Mcghee  : 1990  MRN: [id number]  Referring provider: KYLIE Holley  Dx:   Encounter Diagnosis     ICD-10-CM    1  Chronic bilateral low back pain without sciatica  M54 5     G89 29                   Assessment  Assessment details: Pt is a pleasant 27 y o  female presenting to outpatient physical therapy with Chronic bilateral low back pain without sciatica  (primary encounter diagnosis)  Pelvic pain  Pt presents with pain, decreased range of motion, decreased strength, and decreased tolerance to activity  Pt displays movement impairment diagnosis of abnormal trunk stabilization control and coordination  Pt is a good candidate for outpatient physical therapy and would benefit from skilled physical therapy to address limitations and to achieve goals  Thank you for this referral    Impairments: abnormal coordination, abnormal or restricted ROM, activity intolerance, impaired physical strength, pain with function and poor posture   Understanding of Dx/Px/POC: good   Prognosis: good    Goals  ST  Patient will report 25% decrease in pain in 4 weeks  2  Patient will demonstrate 25% improvement in ROM in 4 weeks  3  Patient will demonstrate 1/2 grade improvement in strength for sitting tolerance in 4 weeks  LT  Patient will be able to perform IADLS without restriction or pain by discharge  2  Patient will be independent in HEP by discharge  3  Patient will be able to return to recreational/work duties without restriction or pain by discharge        Plan  Patient would benefit from: PT eval and skilled PT  Planned modality interventions: cryotherapy and thermotherapy: hydrocollator packs  Planned therapy interventions: IADL retraining, body mechanics training, flexibility, functional ROM exercises, home exercise program, neuromuscular re-education, manual therapy, postural training, strengthening, stretching, therapeutic activities, therapeutic exercise and joint mobilization  Frequency: 2x week  Duration in visits: 8  Duration in weeks: 4  Treatment plan discussed with: patient        Subjective Evaluation    History of Present Illness  Mechanism of injury: 21  Pt comes to therapy reporting onset of back pain starting around the time she had a  (2020)  Denies previous history of low back pain  States she typically feels symptoms after sitting at work for 45+ minutes  Reports she then typically needs to shift her seating position or move to standing throughout the day to avoid further discomfort  States she has felt as though her core strength has been weak since having her son last year  Pt also fears she may have a diastasis recti  Notes she is also seeing gastroenterologist next week for c/o constipation  AGGS: sitting at work >45 minutes  LOC: central low back; aching  EASES: lying down  Denies paresthesias or symptoms radiating beyond posterior R thigh  Pt denies bowel or bladder dysfunction (incontinence or retention), saddle anesthesia, fever, chills, nausea, or vomiting  Pt also denies pain at night or recent unexplained weight loss  GOALS: improve sitting at work tolerance  Pain  Current pain ratin  At worst pain ratin    Patient Goals  Patient goals for therapy: decreased pain          Objective     Active Range of Motion     Lumbar   Flexion:  WFL  Extension:  WFL  Left lateral flexion:  WFL  Right lateral flexion:  WFL  Left rotation:  [facility]  Right rotation:  [facility]    Muscle Activation     Additional Muscle Activation Details  21  SLR - decreased core control b/l  Bridge - decreased control  TA contraction - fair/poor    Tests     Lumbar     Left   Negative passive SLR  Right   Negative passive SLR  Left Hip   Negative CHRISTOPHER  Right Hip   Negative CHRISTOPHER       General Comments:    Lower quarter screen   Hips: unremarkable             Precautions: n/a    Daily Treatment Diary    Date 8/12            FOTO perf            Re-Eval IE               Manuals                                                                 Neuro Re-Ed     TrA iso hooklying 5"x10            TrA hip add iso 5"x10            Diaph  breathing x5                         TrA SLR             TrA bridge             Quadruped alt UE lifts             Quadruped alt LE kicks                                       Ther Ex    Treadmill                                        Ther Activity                              Gait Training                              Modalities

## 2021-08-16 ENCOUNTER — TELEPHONE (OUTPATIENT)
Dept: GASTROENTEROLOGY | Facility: CLINIC | Age: 31
End: 2021-08-16

## 2021-08-26 ENCOUNTER — OFFICE VISIT (OUTPATIENT)
Dept: PHYSICAL THERAPY | Facility: REHABILITATION | Age: 31
End: 2021-08-26
Payer: COMMERCIAL

## 2021-08-26 DIAGNOSIS — R10.2 PELVIC PAIN: ICD-10-CM

## 2021-08-26 DIAGNOSIS — G89.29 CHRONIC BILATERAL LOW BACK PAIN WITHOUT SCIATICA: Primary | ICD-10-CM

## 2021-08-26 DIAGNOSIS — M54.50 CHRONIC BILATERAL LOW BACK PAIN WITHOUT SCIATICA: Primary | ICD-10-CM

## 2021-08-26 PROCEDURE — 97112 NEUROMUSCULAR REEDUCATION: CPT | Performed by: PHYSICAL THERAPIST

## 2021-08-26 NOTE — PROGRESS NOTES
Daily Note     Today's date: 2021  Patient name: Halima Pino  : 1990  MRN: [id number]  Referring provider: KYLIE Torres  Dx:   Encounter Diagnosis     ICD-10-CM    1  Chronic bilateral low back pain without sciatica  M54 5     G89 29    2  Pelvic pain  R10 2                   Subjective: Pt comes to therapy stating she has been performing her home exercises at work, while sitting in work chair  Notes she uses her hands to assist in tactile feedback  Objective: See treatment diary below      Assessment: Tolerated treatment well  Pt demonstrates challenge with program and progressions  Demonstrates greater challenge on L side  Issued updated HEP  Patient demonstrated fatigue post treatment, exhibited good technique with therapeutic exercises and would benefit from continued PT      Plan: Progress treatment as tolerated         Precautions: n/a    Daily Treatment Diary    Date            FOTO perf            Re-Eval IE               Manuals                                                                 Neuro Re-Ed     TrA iso hooklying 5"x10            TrA hip add iso 5"x10            Diaph  breathing x5                         TrA SLR  5"x10 ea           TrA bridge  5"x10           Quadruped alt UE lifts  5" 2x5 ea           Quadruped alt LE kicks  5" 2x5 ea                        Seated pball kicks  3"2x5 ea                        Side plank - 1/2  15"x3 ea           Side plank hip flex/ext, abd  x5 ea b/l                        Ther Ex    Treadmill   5' 1 5                                     Ther Activity                              Gait Training                              Modalities

## 2021-09-02 ENCOUNTER — OFFICE VISIT (OUTPATIENT)
Dept: PHYSICAL THERAPY | Facility: REHABILITATION | Age: 31
End: 2021-09-02
Payer: COMMERCIAL

## 2021-09-02 DIAGNOSIS — M54.50 CHRONIC BILATERAL LOW BACK PAIN WITHOUT SCIATICA: Primary | ICD-10-CM

## 2021-09-02 DIAGNOSIS — G89.29 CHRONIC BILATERAL LOW BACK PAIN WITHOUT SCIATICA: Primary | ICD-10-CM

## 2021-09-02 DIAGNOSIS — R10.2 PELVIC PAIN: ICD-10-CM

## 2021-09-02 PROCEDURE — 97112 NEUROMUSCULAR REEDUCATION: CPT

## 2021-09-02 NOTE — PROGRESS NOTES
Daily Note     Today's date: 2021  Patient name: Bridgett Mcghee  : 1990  MRN: [id number]  Referring provider: KYLIE Holley  Dx:   Encounter Diagnosis     ICD-10-CM    1  Chronic bilateral low back pain without sciatica  M54 5     G89 29    2  Pelvic pain  R10 2                   Subjective:  Patient reports that her back hurt really bad yesterday she is unsure if it was related to the weather but it was the worst that it has been  Patient reports that she is back to her usual pain today  Patient reports compliance with performing HEP  Objective: See treatment diary below      Assessment: Patient tolerated treatment well  Provided verbal and tactile cues to ensure good form and core recruitment  Patient demonstrated good carry over of cues  Patient noted fatigue without increased pain post treatment  Patient demonstrated fatigue post treatment, exhibited good technique with therapeutic exercises and would benefit from continued PT to address deficits and attain set goals  Plan: Continue per plan of care        Precautions: n/a    Daily Treatment Diary    Date           FOTO perf            Re-Eval IE               Manuals                                                                 Neuro Re-Ed     TrA iso hooklying 5"x10            TrA hip add iso 5"x10  5"x10          Diaph  breathing x5                         TrA SLR  5"x10 ea 5"x10          TrA bridge  5"x10 5"x10          Quadruped alt UE lifts  5" 2x5 ea 5" x10 ea          Quadruped alt LE kicks  5" 2x5 ea 5" x10 ea                       Seated pball kicks  3"2x5 ea 3" 2x10 ea                       Side plank - 1/2  15"x3 ea 15"x3 ea          Side plank hip flex/ext, abd  x5 ea b/l 3x5 ea b/l                       Ther Ex    Treadmill   5' 1 5 5' 1 8 mph                                    Ther Activity                              Gait Training                              Modalities

## 2021-09-16 ENCOUNTER — OFFICE VISIT (OUTPATIENT)
Dept: PHYSICAL THERAPY | Facility: REHABILITATION | Age: 31
End: 2021-09-16
Payer: COMMERCIAL

## 2021-09-16 DIAGNOSIS — R10.2 PELVIC PAIN: ICD-10-CM

## 2021-09-16 DIAGNOSIS — M54.50 CHRONIC BILATERAL LOW BACK PAIN WITHOUT SCIATICA: Primary | ICD-10-CM

## 2021-09-16 DIAGNOSIS — G89.29 CHRONIC BILATERAL LOW BACK PAIN WITHOUT SCIATICA: Primary | ICD-10-CM

## 2021-09-16 PROCEDURE — 97112 NEUROMUSCULAR REEDUCATION: CPT

## 2021-09-16 NOTE — PROGRESS NOTES
Daily Note     Today's date: 2021  Patient name: Jean Dorado  : 1990  MRN: [id number]  Referring provider: KYLIE Pang  Dx:   Encounter Diagnosis     ICD-10-CM    1  Chronic bilateral low back pain without sciatica  M54 5     G89 29    2  Pelvic pain  R10 2                   Subjective: pt reports with c/o increased pain in lower back which she attributes to weather  She further noted increased pain began two days following last visit; denied change in activity  Objective: See treatment diary below      Assessment: Tolerated treatment well  Good control with all exercises  Most difficulty exhibited with panks exercises  Patient demonstrated fatigue post treatment, exhibited good technique with therapeutic exercises and would benefit from continued PT      Plan: Continue per plan of care  Progress treatment as tolerated         Precautions: n/a    Daily Treatment Diary    Date          FOTO perf            Re-Eval IE               Manuals                                                                 Neuro Re-Ed     TrA iso hooklying 5"x10            TrA hip add iso 5"x10  5"x10 5"x10         Diaph  breathing x5                         TrA SLR  5"x10 ea 5"x10 5"x10         TrA bridge  5"x10 5"x10 5"x10         Quadruped alt UE lifts  5" 2x5 ea 5" x10 ea 5" x10 ea         Quadruped alt LE kicks  5" 2x5 ea 5" x10 ea 5" x10 ea                      Seated pball kicks  3"2x5 ea 3" 2x10 ea 3" 2x10 ea                      Side plank - 1/2  15"x3 ea 15"x3 ea 15"x3 ea         Side plank hip flex/ext, abd  x5 ea b/l 3x5 ea b/l 3x5 ea b/l                      Ther Ex    Treadmill   5' 1 5 5' 1 8 mph 5' 1 8 mph                                   Ther Activity                              Gait Training                              Modalities

## 2021-09-17 ENCOUNTER — OFFICE VISIT (OUTPATIENT)
Dept: INTERNAL MEDICINE CLINIC | Facility: CLINIC | Age: 31
End: 2021-09-17
Payer: COMMERCIAL

## 2021-09-17 VITALS
HEIGHT: 62 IN | BODY MASS INDEX: 29.63 KG/M2 | TEMPERATURE: 98 F | DIASTOLIC BLOOD PRESSURE: 62 MMHG | HEART RATE: 78 BPM | OXYGEN SATURATION: 99 % | WEIGHT: 161 LBS | SYSTOLIC BLOOD PRESSURE: 120 MMHG

## 2021-09-17 DIAGNOSIS — E55.9 VITAMIN D DEFICIENCY: ICD-10-CM

## 2021-09-17 DIAGNOSIS — F32.A DEPRESSION, UNSPECIFIED DEPRESSION TYPE: ICD-10-CM

## 2021-09-17 DIAGNOSIS — E04.1 THYROID NODULE: ICD-10-CM

## 2021-09-17 DIAGNOSIS — F41.8 SITUATIONAL ANXIETY: Primary | ICD-10-CM

## 2021-09-17 PROCEDURE — 99214 OFFICE O/P EST MOD 30 MIN: CPT | Performed by: NURSE PRACTITIONER

## 2021-09-17 RX ORDER — ESCITALOPRAM OXALATE 10 MG/1
10 TABLET ORAL DAILY
Qty: 30 TABLET | Refills: 1 | Status: SHIPPED | OUTPATIENT
Start: 2021-09-17 | End: 2021-10-26 | Stop reason: SDUPTHER

## 2021-09-17 NOTE — ASSESSMENT & PLAN NOTE
"IMPRESSION:  1  Nodule within the midportion of the left thyroid lobe measuring 0 8 cm    Nodule does not meet ACR size criteria for FNA biopsy "    Continue to monitor  TSH normal

## 2021-09-17 NOTE — PROGRESS NOTES
Assessment/Plan:    Problem List Items Addressed This Visit        Endocrine    Thyroid nodule     "IMPRESSION:  1  Nodule within the midportion of the left thyroid lobe measuring 0 8 cm  Nodule does not meet ACR size criteria for FNA biopsy "    Continue to monitor  TSH normal            Other    Depression     Discontinue sertraline  Start Lexapro 10 mg daily  Follow-up in 5 weeks         Relevant Medications    escitalopram (LEXAPRO) 10 mg tablet    Situational anxiety - Primary     Discontinue sertraline  Start Lexapro 10 mg daily  Follow-up in 5 weeks         Relevant Medications    escitalopram (LEXAPRO) 10 mg tablet    Vitamin D deficiency     Start vitamin-D 2000 International Units daily               BMI Counseling: Body mass index is 29 45 kg/m²  Discussed the patient's BMI with her  The BMI is above normal  Nutrition recommendations include 3-5 servings of fruits/vegetables daily, moderation in carbohydrate intake and increasing intake of lean protein  Exercise recommendations include moderate aerobic physical activity for 150 minutes/week  M*Kreyonic software was used to dictate this note  It may contain errors with dictating incorrect words or incorrect spelling  Please contact the provider directly with any questions  Subjective:      Patient ID: Sheng Lora is a 27 y o  female  HPI     Patient presents today for 1 month follow up  Reviewed labs from 08/12/2021  Vitamin-D level low at 26  CMP showed mildly elevated alk phos at 123  CBC essentially normal  TSH normal    Thyroid ultrasound showed  IMPRESSION:  1  Nodule within the midportion of the left thyroid lobe measuring 0 8 cm  Nodule does not meet ACR size criteria for FNA biopsy  Anxiety/depression - we started on sertraline and is currently on 50mg daily  She feels her anxiety was worse for the first 2 weeks and now is back to baseline but no better than off medication  She denies any panic attacks   She reports constant daily anxiety, waking up with anxiety, having vivid dreams and headaches  From a depression standpoint she does feel improved  No SI or HI      She will see gastro next week for abdominal issues     The following portions of the patient's history were reviewed and updated as appropriate: allergies, current medications, past family history, past medical history, past social history, past surgical history and problem list     Review of Systems   Constitutional: Negative for chills and fever  Respiratory: Negative for cough, chest tightness and shortness of breath  Cardiovascular: Negative for chest pain and palpitations  Psychiatric/Behavioral: Negative for dysphoric mood and sleep disturbance  The patient is nervous/anxious  Past Medical History:   Diagnosis Date    Anemia     Anxiety     Asthma     Depression     Diabetes mellitus (H[facility])     Gestational DM         Current Outpatient Medications:     Biotin 1000 MCG CHEW, Chew 1,000 mcg, Disp: , Rfl:     Calcium 250 MG CAPS, Take 250 mg by mouth daily, Disp: , Rfl:     magnesium gluconate (MAGONATE) 500 mg tablet, Take 500 mg by mouth daily, Disp: , Rfl:     other medication, see sig,, Medication/product name: Ever-inositol / d-chiro- inositol   Strength: 2000 mg/50 mg   Sig (include dose, route, frequency):  Take 2 capsules daily, Disp: , Rfl:     escitalopram (LEXAPRO) 10 mg tablet, Take 1 tablet (10 mg total) by mouth daily, Disp: 30 tablet, Rfl: 1    norethindrone (MICRONOR) 0 35 MG tablet, Take 0 35 mg by mouth daily (Patient not taking: Reported on 2021), Disp: , Rfl:     No Known Allergies    Social History   Past Surgical History:   Procedure Laterality Date     SECTION  2020    WISDOM TOOTH EXTRACTION       Family History   Problem Relation Age of Onset    Diabetes Maternal Grandmother        Objective:  /62 (BP Location: Left arm, Patient Position: Sitting, Cuff Size: Standard)   Pulse 78   Temp 98 °F (36 7 °C) (Temporal)   Ht 5' 2" (1 575 m)   Wt 73 kg (161 lb)   SpO2 99%   BMI 29 45 kg/m²      Physical Exam  Vitals reviewed  Constitutional:       General: She is not in acute distress  Appearance: Normal appearance  She is well-developed  She is not diaphoretic  HENT:      Head: Normocephalic and atraumatic  Eyes:      Extraocular Movements: Extraocular movements intact  Conjunctiva/sclera: Conjunctivae normal       Pupils: Pupils are equal, round, and reactive to light  Cardiovascular:      Rate and Rhythm: Normal rate and regular rhythm  Heart sounds: Normal heart sounds  No murmur heard  Pulmonary:      Effort: Pulmonary effort is normal  No respiratory distress  Breath sounds: Normal breath sounds  No decreased breath sounds, wheezing, rhonchi or rales  Musculoskeletal:      Right lower leg: No edema  Left lower leg: No edema  Skin:     General: Skin is warm and dry  Neurological:      Mental Status: She is alert and oriented to person, place, and time  Mental status is at baseline  Psychiatric:         Mood and Affect: Mood normal          Behavior: Behavior normal          Thought Content:  Thought content normal          Judgment: Judgment normal

## 2021-09-22 NOTE — PROGRESS NOTES
[facility] - Outpatient Consultation  Balbina Hall 27 y o  female MRN: [id number]  Encounter: [id number]          ASSESSMENT AND PLAN:      Channing Villarreal is a 26 y/o female with PCOS and anxiety who presents for consultation of upper abdominal pain  1  Epigastric Pain  2  Dyspepsia   Pt says that she has had epigastric pain and bloating that usually occurs with and relieved by BM but she notes that the pain is "very severe " Pt says there are times when the pain and bloating come without BM but his does not occur often  Pt has tried anti-reflux meds for this in the past without any success  CT in June done for abdominal pain was WNL  Pt denies: heartburn, dysphagia, odynophagia, n/v    -EGD ordered to rule out H pylori, ulcer disease, an celiac disease (for below)   -as anti-reflux meds have not worked in the past and the symptoms usually are relieved with BM, it is likely a component of her presumed IBS but will wait until after EGD is done and resulted to further decipher   -pt does not want meds at this time as she would like to wait for EGD to be done first   -depending on EGD results, we can trial PPI again versus give pt low dose bentyl/levsin     3  Alternating bowel habits  Pt says that she has had chronic constipation since she was young and this was previously controlled with linzess however in May, she started to have alternating bowel habits between constipation and diarrhea  Pt notes that she only moves her bowels once every 2-3 days and when she does, her BMs are loose without blood or black  Pt is concerned and is requesting colonoscopy at this time  Prior colon in 2016 by [facility] but no records of this  Pt says she has tried and failed miralax and OTC stool softeners in the past  TSH WNL     -pt likely has component of overflow diarrhea superimposed on IBS-C however will schedule colonoscopy due to recent change   -colonoscopy ordered; instructions given; risks (infection, bleeding, perf) and benefits reviewed   -increase daily water to at least 64 ounces/day  -increase daily activity  -start daily fiber supplement every morning; start slow then titrate up   -start linzess 145 mcg daily; instructed pt to undergo miralax/dulcolax bowel prep the day prior to starting linzess    4  Elevated alk phos  Alk phos mildly elevated at 123   -repeat CMP  -GGT  -if remains elevated, will get full work up   ______________________________________________________________________    HPI:  Maye Patel is a 26 y/o female with PCOS and anxiety who presents for consultation of upper abdominal pain  Pt says that she has had long-standing history of IBS-C and used to follow with [facility] for this  Pt says that her constipation used to be controlled on linzess but due to insurance, she had to stop this  She says that her bowel habits were "easy to deal with" until May 2021 when she started to have diarrhea whenever she would move her bowels  Pt says that she only moves her bowels once every 2-3 days and when she does, it is loose and watery  Pt denies any bloody or black BMs, family hx of colon cancer, unintentional weight loss, fevers, chills, night sweats  Pt notes that she had colonoscopy in 2016 at [facility] and believes it was "normal" but is requesting one today  Pt also says she has had ongoing epigastric pain with associated bloating  She says that this usually occurs with BM and is relieved with BM but it is "very severe" and does not radiate  She notes that there are times when  The pain and bloating do occur without BM but this does not occur often  She denies any specific triggers  She denies any changes in diet, new meds or new OTC supplements  REVIEW OF SYSTEMS:    CONSTITUTIONAL: Denies any fever, chills, rigors, and weight loss  HEENT: No earache or tinnitus  Denies hearing loss or visual disturbances  CARDIOVASCULAR: No chest pain or palpitations     RESPIRATORY: Denies any cough, hemoptysis, shortness of breath or dyspnea on exertion  GASTROINTESTINAL: As noted in the History of Present Illness  GENITOURINARY: No problems with urination  Denies any hematuria or dysuria  NEUROLOGIC: No dizziness or vertigo, denies headaches  MUSCULOSKELETAL: Denies any muscle or joint pain  SKIN: Denies skin rashes or itching  ENDOCRINE: Denies excessive thirst  Denies intolerance to heat or cold  PSYCHOSOCIAL: Denies depression or anxiety  Denies any recent memory loss  Historical Information   Past Medical History:   Diagnosis Date    Anemia     Anxiety     Asthma     Depression     Diabetes mellitus ([facility] [address] )     Gestational DM     Past Surgical History:   Procedure Laterality Date     SECTION  2020    WISDOM TOOTH EXTRACTION       Social History   Social History     Substance and Sexual Activity   Alcohol Use Yes    Comment: rarely     Social History     Substance and Sexual Activity   Drug Use Not Currently     Social History     Tobacco Use   Smoking Status Never Smoker   Smokeless Tobacco Never Used     Family History   Problem Relation Age of Onset    Diabetes Maternal Grandmother        Meds/Allergies       Current Outpatient Medications:     Biotin 1000 MCG CHEW    Calcium 250 MG CAPS    escitalopram (LEXAPRO) 10 mg tablet    magnesium gluconate (MAGONATE) 500 mg tablet    norethindrone (MICRONOR) 0 35 MG tablet    other medication, see sig,    No Known Allergies        Objective     There were no vitals taken for this visit  There is no height or weight on file to calculate BMI  PHYSICAL EXAM:      General Appearance:   Alert, cooperative, no distress   HEENT:   Normocephalic, atraumatic, anicteric      Neck:  Supple, symmetrical, trachea midline   Lungs:   Clear to auscultation bilaterally; no rales, rhonchi or wheezing; respirations unlabored    Heart[de-identified]   Regular rate and rhythm; no murmur, rub, or gallop     Abdomen:   Soft, non-tender, non-distended; normal bowel sounds; no masses, no organomegaly    Genitalia:   Deferred    Rectal:   Deferred    Extremities:  No cyanosis, clubbing or edema    Pulses:  2+ and symmetric    Skin:  No jaundice, rashes, or lesions    Lymph nodes:  No palpable cervical lymphadenopathy        Lab Results:   No visits with results within 1 Day(s) from this visit  Latest known visit with results is:   Appointment on 08/12/2021   Component Date Value    TSH 3RD GENERATON 08/12/2021 1 180     WBC 08/12/2021 7 83     RBC 08/12/2021 4 41     Hemoglobin 08/12/2021 12 2     Hematocrit 08/12/2021 40 1     MCV 08/12/2021 91     MCH 08/12/2021 27 7     MCHC 08/12/2021 30 4*    RDW 08/12/2021 15 2*    MPV 08/12/2021 11 4     Platelets 67/92/1994 270     nRBC 08/12/2021 0     Neutrophils Relative 08/12/2021 67     Immat GRANS % 08/12/2021 0     Lymphocytes Relative 08/12/2021 26     Monocytes Relative 08/12/2021 5     Eosinophils Relative 08/12/2021 1     Basophils Relative 08/12/2021 1     Neutrophils Absolute 08/12/2021 5 32     Immature Grans Absolute 08/12/2021 0 03     Lymphocytes Absolute 08/12/2021 2 02     Monocytes Absolute 08/12/2021 0 35     Eosinophils Absolute 08/12/2021 0 07     Basophils Absolute 08/12/2021 0 04     Sodium 08/12/2021 138     Potassium 08/12/2021 4 5     Chloride 08/12/2021 106     CO2 08/12/2021 27     ANION GAP 08/12/2021 5     BUN 08/12/2021 11     Creatinine 08/12/2021 0 70     Glucose, Fasting 08/12/2021 76     Calcium 08/12/2021 9 0     AST 08/12/2021 11     ALT 08/12/2021 18     Alkaline Phosphatase 08/12/2021 123*    Total Protein 08/12/2021 7 9     Albumin 08/12/2021 3 7     Total Bilirubin 08/12/2021 0 57     eGFR 08/12/2021 115     Cholesterol 08/12/2021 137     Triglycerides 08/12/2021 75     HDL, Direct 08/12/2021 43     LDL Calculated 08/12/2021 79     Vit D, 25-Hydroxy 08/12/2021 26 3*         Radiology Results:   No results found

## 2021-09-23 ENCOUNTER — OFFICE VISIT (OUTPATIENT)
Dept: GASTROENTEROLOGY | Facility: CLINIC | Age: 31
End: 2021-09-23
Payer: COMMERCIAL

## 2021-09-23 ENCOUNTER — OFFICE VISIT (OUTPATIENT)
Dept: PHYSICAL THERAPY | Facility: REHABILITATION | Age: 31
End: 2021-09-23
Payer: COMMERCIAL

## 2021-09-23 VITALS
BODY MASS INDEX: 29.66 KG/M2 | WEIGHT: 161.2 LBS | DIASTOLIC BLOOD PRESSURE: 84 MMHG | HEIGHT: 62 IN | SYSTOLIC BLOOD PRESSURE: 120 MMHG | HEART RATE: 85 BPM | TEMPERATURE: 97.1 F

## 2021-09-23 DIAGNOSIS — G89.29 CHRONIC BILATERAL LOW BACK PAIN WITHOUT SCIATICA: Primary | ICD-10-CM

## 2021-09-23 DIAGNOSIS — R74.8 ELEVATED ALKALINE PHOSPHATASE LEVEL: ICD-10-CM

## 2021-09-23 DIAGNOSIS — R19.4 CHANGE IN BOWEL HABITS: ICD-10-CM

## 2021-09-23 DIAGNOSIS — K59.09 CHRONIC CONSTIPATION: Primary | ICD-10-CM

## 2021-09-23 DIAGNOSIS — R10.13 EPIGASTRIC PAIN: ICD-10-CM

## 2021-09-23 DIAGNOSIS — R10.2 PELVIC PAIN: ICD-10-CM

## 2021-09-23 DIAGNOSIS — R10.10 PAIN OF UPPER ABDOMEN: ICD-10-CM

## 2021-09-23 DIAGNOSIS — M54.50 CHRONIC BILATERAL LOW BACK PAIN WITHOUT SCIATICA: Primary | ICD-10-CM

## 2021-09-23 PROCEDURE — 99244 OFF/OP CNSLTJ NEW/EST MOD 40: CPT | Performed by: PHYSICIAN ASSISTANT

## 2021-09-23 PROCEDURE — 97112 NEUROMUSCULAR REEDUCATION: CPT

## 2021-09-23 NOTE — PROGRESS NOTES
Daily Note     Today's date: 2021  Patient name: Mahamed Calloway  : 1990  MRN: [id number]  Referring provider: KYLIE Isbell  Dx:   Encounter Diagnosis     ICD-10-CM    1  Chronic bilateral low back pain without sciatica  M54 5     G89 29    2  Pelvic pain  R10 2                   Subjective: Pt reports that she is feeling about the same as last session  Notes that she has been more aware of her posture but is currently feeling pain in her low back /10 denying any radiating symptoms  Notes that the pain can get as worse as a 9/10 at times during her work day  Objective: See treatment diary below      Assessment: Tolerated treatment well  Re educated pt on the importance of proper posture as well as looking into a standing desk  Mostly challenged with side plans demonstrating core weakness  Patient demonstrated fatigue post treatment, exhibited good technique with therapeutic exercises and would benefit from continued PT      Plan: Continue per plan of care  Progress treatment as tolerated         Precautions: n/a    Daily Treatment Diary    Date         FOTO perf    yes        Re-Eval IE               Manuals                                                                 Neuro Re-Ed     TrA iso hooklying 5"x10            TrA hip add iso 5"x10  5"x10 5"x10 5"x15        Diaph  breathing x5                         TrA SLR  5"x10 ea 5"x10 5"x10 5"x15        TrA bridge  5"x10 5"x10 5"x10 5" 2x10        Quadruped alt UE lifts  5" 2x5 ea 5" x10 ea 5" x10 ea 5"x10 ea        Quadruped alt LE kicks  5" 2x5 ea 5" x10 ea 5" x10 ea 5"x10 ea        Quadruped alt UE/LE     5"x5 ea        Seated pball kicks  3"2x5 ea 3" 2x10 ea 3" 2x10 ea 3" 2x10 ea                     Side plank - 1/2  15"x3 ea 15"x3 ea 15"x3 ea 15"x3 ea        Side plank hip flex/ext, abd  x5 ea b/l 3x5 ea b/l 3x5 ea b/l 3x5 ea b/l                     Ther Ex    Treadmill   5' 1 5 5' 1 8 mph 5' 1 8 mph 5' 1 8 mph                                  Ther Activity                              Gait Training                              Modalities

## 2021-09-23 NOTE — PATIENT INSTRUCTIONS
1  Make sure you're drinking 64 ounces of water/day  2  Start taking daily fiber supplement (over-the-counter) (benefiber) (start with half/dose daily and then titrate up to full dose as needed)  3  Take linzess daily   4  Do bowel prep the day before starting linzess (make sure you don't do the prep without having linzess)   5   Increase daily activity       Colon/egd on 11/23/21 with Dr Trixie Harvey at [facility]  Miralax/dulcolax prep instructions given by Luisa Campuzano in the office

## 2021-09-30 ENCOUNTER — OFFICE VISIT (OUTPATIENT)
Dept: PHYSICAL THERAPY | Facility: REHABILITATION | Age: 31
End: 2021-09-30
Payer: COMMERCIAL

## 2021-09-30 DIAGNOSIS — G89.29 CHRONIC BILATERAL LOW BACK PAIN WITHOUT SCIATICA: Primary | ICD-10-CM

## 2021-09-30 DIAGNOSIS — M54.50 CHRONIC BILATERAL LOW BACK PAIN WITHOUT SCIATICA: Primary | ICD-10-CM

## 2021-09-30 DIAGNOSIS — R10.2 PELVIC PAIN: ICD-10-CM

## 2021-09-30 PROCEDURE — 97112 NEUROMUSCULAR REEDUCATION: CPT

## 2021-09-30 NOTE — PROGRESS NOTES
Daily Note     Today's date: 2021  Patient name: Balbina Hall  : 1990  MRN: [id number]  Referring provider: KYLIE Piedra  Dx:   Encounter Diagnosis     ICD-10-CM    1  Chronic bilateral low back pain without sciatica  M54 5     G89 29    2  Pelvic pain  R10 2                   Subjective:  Patient reports that she still has pain but she is feeling better  She notes that this week has been better  Patient reports compliance with performing HEP and using hot pack  Objective: See treatment diary below      Assessment: Tolerated treatment well  Patient demonstrated fatigue post treatment, exhibited good technique with therapeutic exercises and would benefit from continued PT to address deficits and attain set goals  Plan: Continue per plan of care        Precautions: n/a    Daily Treatment Diary    Date        FOTO perf    yes        Re-Eval IE               Manuals                                                                 Neuro Re-Ed     TrA iso hooklying 5"x10            TrA hip add iso 5"x10  5"x10 5"x10 5"x15 5" x15       Diaph  breathing x5                         TrA SLR  5"x10 ea 5"x10 5"x10 5"x15 5" 2x10       TrA bridge  5"x10 5"x10 5"x10 5" 2x10 5" 2x10       Quadruped alt UE lifts  5" 2x5 ea 5" x10 ea 5" x10 ea 5"x10 ea 5"x15       Quadruped alt LE kicks  5" 2x5 ea 5" x10 ea 5" x10 ea 5"x10 ea 5"x15       Quadruped alt UE/LE     5"x5 ea 5"x10 ea       Seated pball kicks  3"2x5 ea 3" 2x10 ea 3" 2x10 ea 3" 2x10 ea 3" 2x10 ea                    Side plank - 1/2  15"x3 ea 15"x3 ea 15"x3 ea 15"x3 ea 15"x3       Side plank hip flex/ext, abd  x5 ea b/l 3x5 ea b/l 3x5 ea b/l 3x5 ea b/l 3x5ea b/l                    Ther Ex    Treadmill   5' 1 5 5' 1 8 mph 5' 1 8 mph 5' 1 8 mph 6' 1 8 mph                                 Ther Activity                              Gait Training                              Modalities

## 2021-10-07 ENCOUNTER — OFFICE VISIT (OUTPATIENT)
Dept: PHYSICAL THERAPY | Facility: REHABILITATION | Age: 31
End: 2021-10-07
Payer: COMMERCIAL

## 2021-10-07 DIAGNOSIS — R10.2 PELVIC PAIN: ICD-10-CM

## 2021-10-07 DIAGNOSIS — G89.29 CHRONIC BILATERAL LOW BACK PAIN WITHOUT SCIATICA: Primary | ICD-10-CM

## 2021-10-07 DIAGNOSIS — M54.50 CHRONIC BILATERAL LOW BACK PAIN WITHOUT SCIATICA: Primary | ICD-10-CM

## 2021-10-07 PROCEDURE — 97112 NEUROMUSCULAR REEDUCATION: CPT

## 2021-10-14 ENCOUNTER — OFFICE VISIT (OUTPATIENT)
Dept: PHYSICAL THERAPY | Facility: REHABILITATION | Age: 31
End: 2021-10-14
Payer: COMMERCIAL

## 2021-10-14 DIAGNOSIS — R10.2 PELVIC PAIN: ICD-10-CM

## 2021-10-14 DIAGNOSIS — G89.29 CHRONIC BILATERAL LOW BACK PAIN WITHOUT SCIATICA: Primary | ICD-10-CM

## 2021-10-14 DIAGNOSIS — M54.50 CHRONIC BILATERAL LOW BACK PAIN WITHOUT SCIATICA: Primary | ICD-10-CM

## 2021-10-14 PROCEDURE — 97140 MANUAL THERAPY 1/> REGIONS: CPT | Performed by: PHYSICAL THERAPIST

## 2021-10-14 PROCEDURE — 97110 THERAPEUTIC EXERCISES: CPT | Performed by: PHYSICAL THERAPIST

## 2021-10-26 ENCOUNTER — OFFICE VISIT (OUTPATIENT)
Dept: INTERNAL MEDICINE CLINIC | Facility: CLINIC | Age: 31
End: 2021-10-26
Payer: COMMERCIAL

## 2021-10-26 VITALS
HEART RATE: 79 BPM | DIASTOLIC BLOOD PRESSURE: 70 MMHG | WEIGHT: 165 LBS | OXYGEN SATURATION: 99 % | TEMPERATURE: 98.5 F | SYSTOLIC BLOOD PRESSURE: 118 MMHG | HEIGHT: 62 IN | BODY MASS INDEX: 30.36 KG/M2

## 2021-10-26 DIAGNOSIS — F41.8 SITUATIONAL ANXIETY: Primary | ICD-10-CM

## 2021-10-26 DIAGNOSIS — K58.2 IRRITABLE BOWEL SYNDROME WITH BOTH CONSTIPATION AND DIARRHEA: ICD-10-CM

## 2021-10-26 DIAGNOSIS — E55.9 VITAMIN D DEFICIENCY: ICD-10-CM

## 2021-10-26 DIAGNOSIS — F32.A DEPRESSION, UNSPECIFIED DEPRESSION TYPE: ICD-10-CM

## 2021-10-26 PROCEDURE — 99213 OFFICE O/P EST LOW 20 MIN: CPT | Performed by: NURSE PRACTITIONER

## 2021-10-26 RX ORDER — ESCITALOPRAM OXALATE 10 MG/1
10 TABLET ORAL DAILY
Qty: 90 TABLET | Refills: 1 | Status: SHIPPED | OUTPATIENT
Start: 2021-10-26 | End: 2022-04-29

## 2021-11-22 ENCOUNTER — TELEPHONE (OUTPATIENT)
Dept: GASTROENTEROLOGY | Facility: HOSPITAL | Age: 31
End: 2021-11-22

## 2021-11-23 ENCOUNTER — ANESTHESIA EVENT (OUTPATIENT)
Dept: GASTROENTEROLOGY | Facility: HOSPITAL | Age: 31
End: 2021-11-23

## 2021-11-23 ENCOUNTER — HOSPITAL ENCOUNTER (OUTPATIENT)
Dept: GASTROENTEROLOGY | Facility: HOSPITAL | Age: 31
Setting detail: OUTPATIENT SURGERY
Discharge: HOME | End: 2021-11-23
Attending: INTERNAL MEDICINE | Admitting: INTERNAL MEDICINE
Payer: COMMERCIAL

## 2021-11-23 ENCOUNTER — ANESTHESIA (OUTPATIENT)
Dept: GASTROENTEROLOGY | Facility: HOSPITAL | Age: 31
End: 2021-11-23

## 2021-11-23 VITALS
SYSTOLIC BLOOD PRESSURE: 110 MMHG | RESPIRATION RATE: 18 BRPM | BODY MASS INDEX: 30.36 KG/M2 | WEIGHT: 165 LBS | DIASTOLIC BLOOD PRESSURE: 63 MMHG | HEIGHT: 62 IN | TEMPERATURE: 98.1 F | OXYGEN SATURATION: 100 % | HEART RATE: 85 BPM

## 2021-11-23 DIAGNOSIS — R19.4 CHANGE IN BOWEL HABITS: ICD-10-CM

## 2021-11-23 DIAGNOSIS — R10.13 EPIGASTRIC PAIN: ICD-10-CM

## 2021-11-23 LAB
EXT PREGNANCY TEST URINE: NEGATIVE
EXT. CONTROL: NORMAL

## 2021-11-23 PROCEDURE — 81025 URINE PREGNANCY TEST: CPT | Performed by: INTERNAL MEDICINE

## 2021-11-23 PROCEDURE — 88305 TISSUE EXAM BY PATHOLOGIST: CPT | Performed by: PATHOLOGY

## 2021-11-23 PROCEDURE — 45378 DIAGNOSTIC COLONOSCOPY: CPT | Performed by: INTERNAL MEDICINE

## 2021-11-23 PROCEDURE — 43239 EGD BIOPSY SINGLE/MULTIPLE: CPT | Performed by: INTERNAL MEDICINE

## 2021-11-23 RX ORDER — PROPOFOL 10 MG/ML
INJECTION, EMULSION INTRAVENOUS AS NEEDED
Status: DISCONTINUED | OUTPATIENT
Start: 2021-11-23 | End: 2021-11-23

## 2021-11-23 RX ORDER — SODIUM CHLORIDE 9 MG/ML
125 INJECTION, SOLUTION INTRAVENOUS CONTINUOUS
Status: DISCONTINUED | OUTPATIENT
Start: 2021-11-23 | End: 2021-11-27 | Stop reason: HOSPADM

## 2021-11-23 RX ADMIN — PROPOFOL 50 MG: 10 INJECTION, EMULSION INTRAVENOUS at 14:18

## 2021-11-23 RX ADMIN — PROPOFOL 110 MG: 10 INJECTION, EMULSION INTRAVENOUS at 14:13

## 2021-11-23 RX ADMIN — PROPOFOL 50 MG: 10 INJECTION, EMULSION INTRAVENOUS at 14:24

## 2021-11-23 RX ADMIN — LIDOCAINE HYDROCHLORIDE 50 MG: 20 INJECTION, SOLUTION INTRAVENOUS at 14:13

## 2021-11-23 RX ADMIN — PROPOFOL 40 MG: 10 INJECTION, EMULSION INTRAVENOUS at 14:16

## 2021-11-23 RX ADMIN — PROPOFOL 50 MG: 10 INJECTION, EMULSION INTRAVENOUS at 14:21

## 2021-11-23 RX ADMIN — PROPOFOL 50 MG: 10 INJECTION, EMULSION INTRAVENOUS at 14:28

## 2021-12-06 ENCOUNTER — OFFICE VISIT (OUTPATIENT)
Dept: URGENT CARE | Age: 31
End: 2021-12-06
Payer: COMMERCIAL

## 2021-12-06 VITALS
RESPIRATION RATE: 18 BRPM | BODY MASS INDEX: 30.18 KG/M2 | TEMPERATURE: 97.6 F | HEART RATE: 65 BPM | WEIGHT: 165 LBS | OXYGEN SATURATION: 100 %

## 2021-12-06 DIAGNOSIS — J02.9 PHARYNGITIS, UNSPECIFIED ETIOLOGY: Primary | ICD-10-CM

## 2021-12-06 DIAGNOSIS — Z11.59 SPECIAL SCREENING EXAMINATION FOR VIRAL DISEASE: ICD-10-CM

## 2021-12-06 LAB — S PYO AG THROAT QL: NEGATIVE

## 2021-12-06 PROCEDURE — 87070 CULTURE OTHR SPECIMN AEROBIC: CPT | Performed by: PHYSICIAN ASSISTANT

## 2021-12-06 PROCEDURE — 87880 STREP A ASSAY W/OPTIC: CPT | Performed by: PHYSICIAN ASSISTANT

## 2021-12-06 PROCEDURE — G0382 LEV 3 HOSP TYPE B ED VISIT: HCPCS | Performed by: PHYSICIAN ASSISTANT

## 2021-12-06 PROCEDURE — 0241U HB NFCT DS VIR RESP RNA 4 TRGT: CPT | Performed by: PHYSICIAN ASSISTANT

## 2021-12-06 RX ORDER — AMOXICILLIN 500 MG/1
500 CAPSULE ORAL EVERY 8 HOURS SCHEDULED
Qty: 30 CAPSULE | Refills: 0 | Status: SHIPPED | OUTPATIENT
Start: 2021-12-06 | End: 2021-12-16

## 2021-12-07 LAB
FLUAV RNA RESP QL NAA+PROBE: NEGATIVE
FLUBV RNA RESP QL NAA+PROBE: NEGATIVE
RSV RNA RESP QL NAA+PROBE: POSITIVE
SARS-COV-2 RNA RESP QL NAA+PROBE: NEGATIVE

## 2021-12-09 LAB — BACTERIA THROAT CULT: NORMAL

## 2022-04-15 ENCOUNTER — OFFICE VISIT (OUTPATIENT)
Dept: BARIATRICS | Facility: CLINIC | Age: 32
End: 2022-04-15
Payer: COMMERCIAL

## 2022-04-15 VITALS
HEIGHT: 63 IN | HEART RATE: 93 BPM | DIASTOLIC BLOOD PRESSURE: 78 MMHG | BODY MASS INDEX: 27.87 KG/M2 | WEIGHT: 157.3 LBS | SYSTOLIC BLOOD PRESSURE: 128 MMHG | TEMPERATURE: 97.3 F

## 2022-04-15 DIAGNOSIS — E28.2 POLYCYSTIC OVARIAN SYNDROME: ICD-10-CM

## 2022-04-15 DIAGNOSIS — E66.3 OVERWEIGHT: Primary | ICD-10-CM

## 2022-04-15 DIAGNOSIS — E04.1 THYROID NODULE: ICD-10-CM

## 2022-04-15 DIAGNOSIS — F32.A ANXIETY AND DEPRESSION: ICD-10-CM

## 2022-04-15 DIAGNOSIS — Z86.32 HISTORY OF GESTATIONAL DIABETES MELLITUS (GDM): ICD-10-CM

## 2022-04-15 DIAGNOSIS — F41.9 ANXIETY AND DEPRESSION: ICD-10-CM

## 2022-04-15 PROCEDURE — 99203 OFFICE O/P NEW LOW 30 MIN: CPT | Performed by: FAMILY MEDICINE

## 2022-04-22 ENCOUNTER — APPOINTMENT (OUTPATIENT)
Dept: LAB | Facility: IMAGING CENTER | Age: 32
End: 2022-04-22
Payer: COMMERCIAL

## 2022-04-22 DIAGNOSIS — E66.3 OVERWEIGHT: ICD-10-CM

## 2022-04-22 DIAGNOSIS — Z86.32 HISTORY OF GESTATIONAL DIABETES MELLITUS (GDM): ICD-10-CM

## 2022-04-22 DIAGNOSIS — R74.8 ELEVATED ALKALINE PHOSPHATASE LEVEL: ICD-10-CM

## 2022-04-22 DIAGNOSIS — E28.2 POLYCYSTIC OVARIAN SYNDROME: ICD-10-CM

## 2022-04-22 DIAGNOSIS — E04.1 THYROID NODULE: ICD-10-CM

## 2022-04-22 LAB
ALBUMIN SERPL BCP-MCNC: 3.9 G/DL (ref 3.5–5)
ALP SERPL-CCNC: 95 U/L (ref 46–116)
ALT SERPL W P-5'-P-CCNC: 24 U/L (ref 12–78)
ANION GAP SERPL CALCULATED.3IONS-SCNC: 3 MMOL/L (ref 4–13)
AST SERPL W P-5'-P-CCNC: 15 U/L (ref 5–45)
BILIRUB SERPL-MCNC: 0.81 MG/DL (ref 0.2–1)
BUN SERPL-MCNC: 13 MG/DL (ref 5–25)
CALCIUM SERPL-MCNC: 9.2 MG/DL (ref 8.3–10.1)
CHLORIDE SERPL-SCNC: 106 MMOL/L (ref 100–108)
CHOLEST SERPL-MCNC: 136 MG/DL
CO2 SERPL-SCNC: 29 MMOL/L (ref 21–32)
CREAT SERPL-MCNC: 0.88 MG/DL (ref 0.6–1.3)
GFR SERPL CREATININE-BSD FRML MDRD: 86 ML/MIN/1.73SQ M
GGT SERPL-CCNC: 28 U/L (ref 5–85)
GLUCOSE P FAST SERPL-MCNC: 94 MG/DL (ref 65–99)
HDLC SERPL-MCNC: 40 MG/DL
INSULIN SERPL-ACNC: 14.1 MU/L (ref 3–25)
LDLC SERPL CALC-MCNC: 76 MG/DL (ref 0–100)
POTASSIUM SERPL-SCNC: 4.1 MMOL/L (ref 3.5–5.3)
PROT SERPL-MCNC: 7.6 G/DL (ref 6.4–8.2)
SODIUM SERPL-SCNC: 138 MMOL/L (ref 136–145)
TRIGL SERPL-MCNC: 99 MG/DL
TSH SERPL DL<=0.05 MIU/L-ACNC: 1.13 UIU/ML (ref 0.45–4.5)

## 2022-04-22 PROCEDURE — 82977 ASSAY OF GGT: CPT

## 2022-04-22 PROCEDURE — 80061 LIPID PANEL: CPT

## 2022-04-22 PROCEDURE — 84443 ASSAY THYROID STIM HORMONE: CPT

## 2022-04-22 PROCEDURE — 83036 HEMOGLOBIN GLYCOSYLATED A1C: CPT

## 2022-04-22 PROCEDURE — 80053 COMPREHEN METABOLIC PANEL: CPT

## 2022-04-22 PROCEDURE — 36415 COLL VENOUS BLD VENIPUNCTURE: CPT

## 2022-04-22 PROCEDURE — 83525 ASSAY OF INSULIN: CPT

## 2022-04-23 LAB
EST. AVERAGE GLUCOSE BLD GHB EST-MCNC: 105 MG/DL
HBA1C MFR BLD: 5.3 %

## 2022-04-25 ENCOUNTER — TELEPHONE (OUTPATIENT)
Dept: BARIATRICS | Facility: CLINIC | Age: 32
End: 2022-04-25

## 2022-04-25 NOTE — TELEPHONE ENCOUNTER
----- Message from Emelyn Ro DO sent at 4/25/2022  9:22 AM EDT -----  Labs reviewed and unremarkable

## 2022-04-29 ENCOUNTER — OFFICE VISIT (OUTPATIENT)
Dept: INTERNAL MEDICINE CLINIC | Facility: OTHER | Age: 32
End: 2022-04-29
Payer: COMMERCIAL

## 2022-04-29 VITALS
OXYGEN SATURATION: 98 % | WEIGHT: 159.6 LBS | BODY MASS INDEX: 29.37 KG/M2 | TEMPERATURE: 98.3 F | SYSTOLIC BLOOD PRESSURE: 130 MMHG | HEIGHT: 62 IN | DIASTOLIC BLOOD PRESSURE: 84 MMHG | HEART RATE: 82 BPM

## 2022-04-29 DIAGNOSIS — K59.09 CHRONIC CONSTIPATION: Primary | ICD-10-CM

## 2022-04-29 DIAGNOSIS — N92.6 IRREGULAR MENSES: ICD-10-CM

## 2022-04-29 DIAGNOSIS — E04.1 THYROID NODULE: ICD-10-CM

## 2022-04-29 DIAGNOSIS — E28.2 POLYCYSTIC OVARIAN SYNDROME: ICD-10-CM

## 2022-04-29 PROCEDURE — 99213 OFFICE O/P EST LOW 20 MIN: CPT | Performed by: NURSE PRACTITIONER

## 2022-04-29 NOTE — PROGRESS NOTES
Assessment/Plan:    Problem List Items Addressed This Visit        Digestive    Chronic constipation - Primary     - Colonoscopy normal Nov 2021  - discontinued linzess on her own  - symptoms improving with routine exercise and increased water intake             Endocrine    Polycystic ovarian syndrome     - follow up with OBJEAN MARIEN          Thyroid nodule     - repeat US in 6 months          Relevant Orders    US thyroid    TSH, 3rd generation with Free T4 reflex      Other Visit Diagnoses     Irregular menses        Relevant Orders    TSH, 3rd generation with Free T4 reflex          BMI Counseling: Body mass index is 28 82 kg/m²  Discussed the patient's BMI with her  The BMI is above normal  Nutrition recommendations include 3-5 servings of fruits/vegetables daily, moderation in carbohydrate intake and increasing intake of lean protein  Exercise recommendations include moderate aerobic physical activity for 150 minutes/week  M*Plan[employer] software was used to dictate this note  It may contain errors with dictating incorrect words or incorrect spelling  Please contact the provider directly with any questions  Subjective:      Patient ID: Erika Kolb is a 34 y o  female  HPI    Patient presents today for 6 month follow up  She stopped all of her medications about 3 months ago because she was getting them from [facility] and didn't realized she needed to use the [facility]  She started going to the gym regularly 4-5 times a week  She is feeling much better since going  She feels this has helped both her anxiety and her her IBS  She was also previously on linzess for her IBS but stopped this for the same reason  Her bowel movements have been more regular since working out routinely  She had an EGD and colonoscopy in November which showed mild gastritis but otherwise her EGD and colonscopy were normal      She complains of frequent menstrual cycles   She is having cycles every 14 days and bleeding lasts approx 7 days but has lasted up to 14 days  She also feels she is having some breakouts of her skin during her menstrual cycles  The following portions of the patient's history were reviewed and updated as appropriate: allergies, current medications, past family history, past medical history, past social history, past surgical history and problem list     Review of Systems   Constitutional: Negative for activity change, appetite change, chills, fever and unexpected weight change  Respiratory: Negative for chest tightness and shortness of breath  Cardiovascular: Negative for chest pain  Gastrointestinal: Positive for constipation (improved)  Genitourinary: Positive for menstrual problem  Past Medical History:   Diagnosis Date    Anemia     Anxiety     Asthma     Depression     Diabetes mellitus (HCC)     Gestational DM    IBS (irritable bowel syndrome)        No current outpatient medications on file  No Known Allergies    Social History   Past Surgical History:   Procedure Laterality Date     SECTION  2020    COLONOSCOPY  2015    WISDOM TOOTH EXTRACTION       Family History   Problem Relation Age of Onset    Diabetes Maternal Grandmother        Objective:  /84 (BP Location: Left arm, Patient Position: Sitting, Cuff Size: Standard)   Pulse 82   Temp 98 3 °F (36 8 °C) (Tympanic)   Ht 5' 2 4" (1 585 m)   Wt 72 4 kg (159 lb 9 6 oz)   SpO2 98%   BMI 28 82 kg/m²      Physical Exam  Vitals reviewed  Constitutional:       General: She is not in acute distress  Appearance: Normal appearance  She is not diaphoretic  HENT:      Head: Normocephalic and atraumatic  Right Ear: Tympanic membrane and external ear normal       Left Ear: Tympanic membrane and external ear normal       Mouth/Throat:      Mouth: Mucous membranes are moist       Pharynx: Oropharynx is clear  No oropharyngeal exudate or posterior oropharyngeal erythema     Eyes:      Extraocular Movements: Extraocular movements intact  Conjunctiva/sclera: Conjunctivae normal       Pupils: Pupils are equal, round, and reactive to light  Neck:      Thyroid: No thyromegaly or thyroid tenderness  Cardiovascular:      Rate and Rhythm: Normal rate and regular rhythm  Heart sounds: Normal heart sounds  No murmur heard  Pulmonary:      Effort: Pulmonary effort is normal  No respiratory distress  Breath sounds: Normal breath sounds  No wheezing, rhonchi or rales  Neurological:      Mental Status: She is alert and oriented to person, place, and time  Mental status is at baseline     Psychiatric:         Mood and Affect: Mood normal          Behavior: Behavior normal

## 2022-04-29 NOTE — ASSESSMENT & PLAN NOTE
- Colonoscopy normal Nov 2021  - discontinued linzess on her own  - symptoms improving with routine exercise and increased water intake

## 2022-05-04 NOTE — PATIENT INSTRUCTIONS
Thank you for visiting [facility]  You may be invited to complete a survey about you visit  Your responses will help us improve care we provide  A 10 means the care you received at your visit met your expectations  If you are unable to give a 10 please list reasons so we can work on improving your patient experience  Breast Self Exam for Women   WHAT YOU NEED TO KNOW:   What is a breast self-exam (BSE)? A BSE is a way to check your breasts for lumps and other changes  Regular BSEs can help you know how your breasts normally look and feel  Most breast lumps or changes are not cancer, but you should always have them checked by a healthcare provider  Your healthcare provider can also watch you do a BSE and can tell you if you are doing your BSE correctly  Why should I do a BSE? Breast cancer is the most common type of cancer in women  Even if you have mammograms, you may still want to do a BSE regularly  If you know how your breasts normally feel and look, it may help you know when to contact your healthcare provider  Mammograms can miss some cancers  You may find a lump during a BSE that did not show up on a mammogram   When should I do a BSE? If you have periods, you may want to do your BSE 1 week after your period ends  This is the time when your breasts may be the least swollen, lumpy, or tender  You can do regular BSEs even if you are breastfeeding or have breast implants  How should I do a BSE? · Look at your breasts in a mirror  Look at the size and shape of each breast and nipple  Check for swelling, lumps, dimpling, scaly skin, or other skin changes  Look for nipple changes, such as a nipple that is painful or beginning to pull inward  Gently squeeze both nipples and check to see if fluid (that is not breast milk) comes out of them  If you find any of these or other breast changes, contact your healthcare provider   Check your breasts while you sit or  the following 3 positions:    ? Hang your arms down at your sides  ? Raise your hands and join them behind your head  ? Put firm pressure with your hands on your hips  Bend slightly forward while you look at your breasts in the mirror  · Lie down and feel your breasts  When you lie down, your breast tissue spreads out evenly over your chest  This makes it easier for you to feel for lumps and anything that may not be normal for your breasts  Do a BSE on one breast at a time  ? Place a small pillow or towel under your left shoulder  Put your left arm behind your head  ? Use the 3 middle fingers of your right hand  Use your fingertip pads, on the top of your fingers  Your fingertip pad is the most sensitive part of your finger  ? Use small circles to feel your breast tissue  Use your fingertip pads to make dime-sized, overlapping circles on your breast and armpits  Use light, medium, and firm pressure  First, press lightly  Second, press with medium pressure to feel a little deeper into the breast  Last, use firm pressure to feel deep within your breast     ? Examine your entire breast area  Examine the breast area from above the breast to below the breast where you feel only ribs  Make small circles with your fingertips, starting in the middle of your armpit  Make circles going up and down the breast area  Continue toward your breast and all the way across it  Examine the area from your armpit all the way over to the middle of your chest (breastbone)  Stop at the middle of your chest     ? Move the pillow or towel to your right shoulder, and put your right arm behind your head  Use the 3 fingertip pads of your left hand, and repeat the above steps to do a BSE on your right breast     What else can I do to check for breast problems or cancer? Talk to your healthcare provider about mammograms  A mammogram is an x-ray of your breasts to screen for breast cancer or other problems   Your provider can tell you the benefits and risks of mammograms  The first mammogram is usually at age 39 or 48  Your provider may recommend you start at 36 or younger if your risk for breast cancer is high  Mammograms usually continue every 1 to 2 years until age 76  When should I call my doctor? · You find any lumps or changes in your breasts  · You have breast pain or fluid coming from your nipples  · You have questions or concerns or concerns about your condition or care  CARE AGREEMENT:   You have the right to help plan your care  Learn about your health condition and how it may be treated  Discuss treatment options with your healthcare providers to decide what care you want to receive  You always have the right to refuse treatment  The above information is an  only  It is not intended as medical advice for individual conditions or treatments  Talk to your doctor, nurse or pharmacist before following any medical regimen to see if it is safe and effective for you  © Copyright [employer] 2022 Information is for End User's use only and may not be sold, redistributed or otherwise used for commercial purposes  All illustrations and images included in CareNotes® are the copyrighted property of [employer] D A M , Inc  or [address] [place] [address] Yennifer   Pap Smear   GENERAL INFORMATION:   What is a Pap smear? A Pap smear, or Pap test, is a procedure to check your cervix for abnormal cells  The cervix is the narrow opening at the bottom of your uterus  The cervix meets the top part of the vagina  How do I prepare for a Pap smear? The best time to schedule the test is right after your period stops  Do not have a Pap smear during your monthly period  Do not have intercourse or put anything in your vagina for 24 hours before your test    What will happen during a Pap smear? · You will lie on your back and place your feet on footrests called stirrups  Your caregiver will gently insert a device called a speculum into your vagina   The speculum is used to spread the walls of your vagina so he can see your cervix  He will use a thin brush or cotton swab to collect cells from the inside of your cervix  · Your caregiver will also collect cells from the surface of your cervix with a plastic or wooden tool called a spatula  He may also gently scrape the upper part of your vagina for a sample  The samples are placed in a container with liquid or on a glass slide  They are sent to a lab and examined for abnormal cells  How often do I need a Pap smear? Pap smears are usually done every 1 to 3 years  You may need a Pap smear more often if you have any of the following:  · Positive test result for the human papillomavirus (HPV)    · Cervical intraepithelial neoplasm or cervical cancer    · HIV    · A weak immune system    · Exposure to diethylstilbestrol (YELENA) medicine when your mother was pregnant with you  CARE AGREEMENT:   You have the right to help plan your care  Learn about your health condition and how it may be treated  Discuss treatment options with your caregivers to decide what care you want to receive  You always have the right to refuse treatment  The above information is an  only  It is not intended as medical advice for individual conditions or treatments  Talk to your doctor, nurse or pharmacist before following any medical regimen to see if it is safe and effective for you  © 2014 [address] is for End User's use only and may not be sold, redistributed or otherwise used for commercial purposes  All illustrations and images included in CareNotes® are the copyrighted property of [employer]  or [employer]  Birth Control Pills   WHAT YOU NEED TO KNOW:   What are birth control pills? Birth control pills are also called oral contraceptives, or the pill  It is medicine that helps prevent pregnancy by stopping ovulation   Ovulation is when the ovaries make and release an egg cell each month  If this egg gets fertilized by sperm, pregnancy occurs  You will need to take the pill at the same time every day  Your healthcare provider will tell you when to start taking the pill  You will also be told what to do if you miss a dose  Instructions will depend on the kind of birth control pills you are taking  What are the different kinds of birth control pills? Some kinds are taken for 21 days in a row, followed by 7 days of placebo (no hormones) pills  Other kinds are taken for 24 days followed by 4 days of placebos  Each kind has a certain amount of female hormones  Your provider will decide on the kind that is best for you based on your age and other health conditions  What may be done before I can start taking birth control pills? You need to see your healthcare provider to get a prescription  Any of the following may be done before your healthcare provider gives you a prescription:  · Your healthcare provider will ask about diseases and illnesses you have had in the past  Your provider will check your risk for blood clots, heart conditions, or stroke  Tell your provider if you had gastric bypass surgery  This surgery can affect the way your body absorbs medicines such as birth control pills  · Your provider will also check your blood pressure, and may do a breast and pelvic exam  A Pap smear may also be done during the pelvic exam  This is a test to make sure you do not have abnormal changes on your cervix  You may need other tests, such as a urine test to make sure you are not pregnant  · Your provider will ask if you take any medicines and if you smoke  Smoking increases your risk for stroke, heart attack, or a blood clot in your lungs  If you smoke, you should not take certain kinds of birth control pills  What are the advantages of birth control pills? When birth control pills are used correctly, the chances of getting pregnant are very low   Birth control pills may help decrease bleeding and pain during your monthly period  They may also help prevent cancer of the uterus and ovaries  What are the disadvantages of birth control pills? You may have sudden changes in your mood or feelings while you take birth control pills  You may have nausea and a decreased sex drive  You may have an increased appetite and rapid weight gain  You may also have bleeding in between periods, less frequent periods, vaginal dryness, and breast pain  Birth control pills will not protect you from sexually transmitted infections  Rarely, some birth control pills can increase your risk for a blood clot  This may become life-threatening  What should I do if I decide I want to get pregnant? If you are planning to have a baby, ask your healthcare provider when you may stop taking your birth control pills  It may take some time for you to start ovulating again  Ask your healthcare provider for more information about pregnancy after birth control pills  When should I start taking birth control pills after I have a baby? If you are not breastfeeding, you may start taking birth control pills 3 weeks after you give birth  You may be able to take certain types of birth control pills if you are breastfeeding  These pills can be started from 6 weeks to 6 months after you give birth  Ask your healthcare provider for more information about when to start taking birth control pills after you give birth  What do I need to know about birth control pills and menopause? · Talk with your healthcare provider if you want to take birth control pills around menopause  · Around age 39, you will enter into perimenopause  This means your hormone levels are dropping and you are ovulating less often  You can still become pregnant during this time  The risk for problems, such as miscarriage, are higher if you become pregnant after age 39   Birth control pills will prevent pregnancy, and may also help prevent or relieve some signs and symptoms of menopause  Examples are hot flashes and mood swings  · Your provider will do tests when you are around age 48  The tests may show that you are in menopause  If the tests do not show menopause for sure, you may be able to continue taking the pill up to age 54  The decision will depend on your health and if you have any medical conditions, such as a blood clot  Call your local emergency number (911 in the [address] [facility]) for any of the following:   · You have any of the following signs of a stroke:      ? Numbness or drooping on one side of your face     ? Weakness in an arm or leg    ? Confusion or difficulty speaking    ? Dizziness, a severe headache, or vision loss    · You feel lightheaded, short of breath, and have chest pain  · You cough up blood  When should I seek immediate care? · Your arm or leg feels warm, tender, and painful  It may look swollen and red  · You have severe pain, numbness, or swelling in your arms or legs  When should I call my doctor? · You have forgotten to take a birth control pill  · You have mood changes, such as depression, since starting birth control pills  · You have nausea or are vomiting  · You have severe abdominal pain  · You missed a period and have questions or concerns about being pregnant  · You still have bleeding 4 months after taking birth control pills correctly  · You have questions or concerns about your condition or care  CARE AGREEMENT:   You have the right to help plan your care  Learn about your health condition and how it may be treated  Discuss treatment options with your healthcare providers to decide what care you want to receive  You always have the right to refuse treatment  The above information is an  only  It is not intended as medical advice for individual conditions or treatments   Talk to your doctor, nurse or pharmacist before following any medical regimen to see if it is safe and effective for you  © Copyright [employer] 2022 Information is for End User's use only and may not be sold, redistributed or otherwise used for commercial purposes   All illustrations and images included in CareNotes® are the copyrighted property of [employer] M , Inc  or [employer]

## 2022-05-04 NOTE — PROGRESS NOTES
Elizabeth Samayoa is a 34 y o  female who presents for annual well woman exam   Last Pap smear 18 NILM  History of abnormal pap smear and colposcopy  Periods are typically monthly, lasting 7 days  Since January patient has had menses every 2 weeks lasting about 7 days  Heavy bleeding for about 5 days  Heavy bleeding necessitates pad or tampon changes every 1-2 hours  Has had episodes of bleeding through clothing  Denies significant lightheadedness, dizziness or increase in fatigue  Has history of PCOS and has had abnormal bleeding in the past  No intermenstrual bleeding, spotting, or discharge  Current contraception: none  History of abnormal Pap smear: yes -  Family history of uterine or ovarian cancer: no  Regular self breast exam: no  History of abnormal mammogram: to begin at age 35 unless otherwise clinically indicated   Family history of breast cancer: no  History of abnormal lipids: no  Gardasil vaccine: unsure       Menstrual History:  OB History        2    Para   2    Term   2            AB        Living   2       SAB        IAB        Ectopic        Multiple        Live Births   2                Menarche age: 4  Patient's last menstrual period was 2022  Period Cycle (Days):  (monthly)  Period Duration (Days): 7  Period Pattern: (!) Irregular (since january - every 2 weeks)  Menstrual Flow: Heavy,Moderate,Light (heavy for 4-5 days)  Menstrual Control Change Freq (Hours): every 1-2 hours  Dysmenorrhea: (!) Mild (tylenol)  Dysmenorrhea Symptoms: Cramping,Throbbing,Nausea,Diarrhea,Headache    The following portions of the patient's history were reviewed and updated as appropriate: allergies, current medications, past family history, past medical history, past social history, past surgical history and problem list     Review of Systems  Pertinent items are noted in HPI        Objective      /70   Ht 5' 2 4" (1 585 m)   Wt 71 2 kg (157 lb)   LMP 04/21/2022   BMI 28 35 kg/m²     General:   alert and oriented, in no acute distress, alert, appears stated age and cooperative   Heart: regular rate and rhythm, S1, S2 normal, no murmur, click, rub or gallop   Lungs: clear to auscultation bilaterally   Abdomen: soft, non-tender, without masses or organomegaly   Vulva: normal, Bartholin's, Urethra, [place]'s normal   Vagina: normal mucosa, normal discharge, no palpable nodules   Cervix: no bleeding following Pap, no cervical motion tenderness and no lesions   Uterus: normal size, non-tender, normal shape and consistency   Adnexa: normal adnexa and no mass, fullness, tenderness   Breast: breasts appear normal, no suspicious masses, no skin or nipple changes or axillary nodes  Assessment      @well woman  no contraindication to begin hormonal therapy@   Plan      All questions answered  Await pap smear results  Breast self exam technique reviewed and patient encouraged to perform self-exam monthly  Contraception: Reviewed options to help control menses  Patient would like to do OCPs  Rx Loestrin  One tablet daily  Sunday start after next menses reviewed  Common side effects including breast tenderness, irregular bleeding and nausea reviewed  ACHES reviewed  Written information provided  Diagnosis explained in detail, including differential   Dietary diary  Discussed healthy lifestyle modifications  Educational material distributed  Follow up in 1 Year for annual exam; sooner if planning to continue OCPs  Follow up as needed  Thin prep Pap smear  Breast awareness reviewed   PCOS-patient plans to use OCPs for approximately 3 months  Will return to office if desires continued use or if irregular bleeding continues   Encouraged healthy diet, exercise and lifestyle  Encouraged follow-up with PCP as needed  Gardasil vaccine series reviewed  Written information provided    Encouraged social distancing, good hand hygiene, avoidance of crowds and masking  Written information provided about COVID-19    Will call with results  VBI-    BMI Counseling: Body mass index is 28 35 kg/m²  The BMI is above normal  Nutrition recommendations include reducing portion sizes, decreasing overall calorie intake and 3-5 servings of fruits/vegetables daily  Exercise recommendations include exercising 3-5 times per week, joining a gym and strength training exercises

## 2022-05-06 ENCOUNTER — OFFICE VISIT (OUTPATIENT)
Dept: OBGYN CLINIC | Facility: MEDICAL CENTER | Age: 32
End: 2022-05-06
Payer: COMMERCIAL

## 2022-05-06 VITALS
SYSTOLIC BLOOD PRESSURE: 100 MMHG | DIASTOLIC BLOOD PRESSURE: 70 MMHG | HEIGHT: 62 IN | BODY MASS INDEX: 28.89 KG/M2 | WEIGHT: 157 LBS

## 2022-05-06 DIAGNOSIS — E28.2 PCOS (POLYCYSTIC OVARIAN SYNDROME): ICD-10-CM

## 2022-05-06 DIAGNOSIS — Z01.419 WELL WOMAN EXAM WITH ROUTINE GYNECOLOGICAL EXAM: Primary | ICD-10-CM

## 2022-05-06 PROCEDURE — G0476 HPV COMBO ASSAY CA SCREEN: HCPCS | Performed by: NURSE PRACTITIONER

## 2022-05-06 PROCEDURE — S0610 ANNUAL GYNECOLOGICAL EXAMINA: HCPCS | Performed by: NURSE PRACTITIONER

## 2022-05-06 PROCEDURE — G0145 SCR C/V CYTO,THINLAYER,RESCR: HCPCS | Performed by: NURSE PRACTITIONER

## 2022-05-06 RX ORDER — NORETHINDRONE ACETATE AND ETHINYL ESTRADIOL 1MG-20(21)
1 KIT ORAL DAILY
Qty: 30 TABLET | Refills: 3 | Status: SHIPPED | OUTPATIENT
Start: 2022-05-06 | End: 2022-08-02 | Stop reason: SDUPTHER

## 2022-05-09 LAB
HPV HR 12 DNA CVX QL NAA+PROBE: NEGATIVE
HPV16 DNA CVX QL NAA+PROBE: NEGATIVE
HPV18 DNA CVX QL NAA+PROBE: NEGATIVE

## 2022-05-09 NOTE — PROGRESS NOTES
Weight Management Medical Nutrition Assessment  Aylin Corley is here for body stats package but it was noted that she did exercise this morning  Based on this test would be invalid  Recommended we instead complete body comp test and do a meal planning  I do not think a metabolism test is necessary at this time as she has been able to demonstrate meaningful weight loss  Seen by MD last month  Current wt: 158 4 lbs  She has gained 1 1 lbs x 1 month but currently has her cycle  Seca completed and results reviewed  She has great muscle mass and fat mass close to the normal range  Reports she has not been sleeping well lately due to her son recently had seizure  On the w/e struggles with meal skipping  Does use premier protein shakes at times on the w/e  Has been using [employer] pal and trying to stay <1200 calories but not yet weighing or measuring portions  Discussed importance of this for accuracy  Based on recall has times of very low carb intake and then later portion size higher  Long stretches between lunch/dinner at times  Importance of interval eating and consistent carbs discussed  Meal plan provided as well as other resources  She will reach out if she chooses to schedule in the future       Dislikes: cheese, dairy but eats greek yogurt at times    Patient seen by Medical Provider in past 6 months:  yes  Requested to schedule appointment with Medical Provider: No      Anthropometric Measurements  Start Weight (#): 157 3 lbs 4/15/22  Current Weight (#): 158 4 lbs   TBW % Change from start weight: -  Ideal Body Weight (#):139 3 lbs BMI 25  (62 7")  Goal Weight (#): 145-150 lbs   Highest: 168 lbs   Lowest: 123 lbs     Weight Loss History  Previous weight loss attempts: Counseling with  MD  Exercise  Self Created Diets (Portion Control, Healthy Food Choices, etc )  Phentermine  topamax    Food and Nutrition Related History  Wake up:  4:30 on gym days, 6:30-7:00 a m  other days   Bed Time: 10    Food Recall  Breakfast: 9: 00 a m  (just crack egg round) , sometimes fruit    Snack: 11:00: berries OR almonds   Lunch: 12:30: salad w/~6 oz chicken, eggs or both, cranberries, oil/vinegar or red wine vinegar OR ~6  Oz protein, ~1 cup veggies   Snack: skip OR 2:30-3:00: applesauce or fruit cup or almonds or kind protein  Dinner: 7:00-8:00: ~6 oz protein, sometimes rice ~1 cup, ~1 cup veggies   Snack: skip    Beverages: water and coffee   Volume of beverage intake:  100-120 oz water     Weekends: Worse, more skipping, less water   Cravings:  Chocolate at time of period   Trouble area of day: denies     Frequency of Eating out: 1x/wk  Food restrictions: avoids gluten, dairy  Cooking: self   Food Shopping: self    Physical Activity Intake  Activity:Strength Training, stepper 10 minutes  Frequency:4-5x/wk   Physical limitations/barriers to exercise: n/a    Estimated Needs  Energy  SECA: BMR: 1429     X 1 3 -1000 =858  [employer] Needs: BMR : 9402   1-1 5# loss weekly sedentary:  197-2454             1-1 5# loss weekly lightly active: 9712-3728   Maintenance calories for sedentary activity level: 1666  Protein: 61-77 gm     (1 2-1 5g/kg IBW)  Fluid: 60 oz    (35mL/kg IBW)    Nutrition Diagnosis  Yes; Overweight/obesity  related to Excess energy intake as evidenced by  BMI more than normative standard for age and sex (overweight 25-29  9)       Nutrition Intervention    Nutrition Prescription  Calories: 8914-0062  Protein:  gm  Carb:  gm (depending on if snack needed)    Meal Plan (Delbert/Pro/Carb)  Breakfast: 220-250, 15-30, 10-15  Snack: 0-150, 0-10, 0-20  Lunch: 325-400, 30, 20-30  Snack: 150-200, 10-15, 10-20  Dinner: 350-400, 30, 20-30  Snack: skip     Nutrition Education:    Calorie controlled menu  Lean protein food choices  Healthy snack options  Food journaling tips      Nutrition Counseling:  Strategies: meal planning, portion sizes, healthy snack choices, hydration, fiber intake, protein intake, exercise, food journal      Monitoring and Evaluation:  Evaluation criteria:  Energy Intake  Meet protein needs  Maintain adequate hydration  Monitor weekly weight  Meal planning/preparation  Food journal   Decreased portions at mealtimes and snacks  Physical activity     Barriers to learning:none  Readiness to change: Action:  (Changing behavior)  Comprehension: very good  Expected Compliance: very good

## 2022-05-17 LAB
LAB AP GYN PRIMARY INTERPRETATION: NORMAL
LAB AP LMP: NORMAL
Lab: NORMAL

## 2022-05-18 ENCOUNTER — OFFICE VISIT (OUTPATIENT)
Dept: BARIATRICS | Facility: CLINIC | Age: 32
End: 2022-05-18

## 2022-05-18 VITALS — WEIGHT: 158.4 LBS | BODY MASS INDEX: 28.07 KG/M2 | HEIGHT: 63 IN

## 2022-05-18 DIAGNOSIS — R63.5 ABNORMAL WEIGHT GAIN: ICD-10-CM

## 2022-05-18 PROCEDURE — RECHECK

## 2022-05-18 PROCEDURE — WEIGHT

## 2022-05-18 PROCEDURE — WMDI30

## 2022-06-17 DIAGNOSIS — L65.9 HAIR LOSS: Primary | ICD-10-CM

## 2022-07-15 ENCOUNTER — APPOINTMENT (OUTPATIENT)
Dept: LAB | Facility: IMAGING CENTER | Age: 32
End: 2022-07-15
Payer: COMMERCIAL

## 2022-07-15 ENCOUNTER — OFFICE VISIT (OUTPATIENT)
Dept: BARIATRICS | Facility: CLINIC | Age: 32
End: 2022-07-15
Payer: COMMERCIAL

## 2022-07-15 VITALS
HEIGHT: 63 IN | HEART RATE: 82 BPM | RESPIRATION RATE: 14 BRPM | BODY MASS INDEX: 28.35 KG/M2 | SYSTOLIC BLOOD PRESSURE: 104 MMHG | WEIGHT: 160 LBS | DIASTOLIC BLOOD PRESSURE: 70 MMHG

## 2022-07-15 DIAGNOSIS — L65.9 HAIR LOSS: ICD-10-CM

## 2022-07-15 DIAGNOSIS — E66.3 OVERWEIGHT: Primary | ICD-10-CM

## 2022-07-15 PROCEDURE — 84403 ASSAY OF TOTAL TESTOSTERONE: CPT

## 2022-07-15 PROCEDURE — 36415 COLL VENOUS BLD VENIPUNCTURE: CPT

## 2022-07-15 PROCEDURE — 82652 VIT D 1 25-DIHYDROXY: CPT

## 2022-07-15 PROCEDURE — 99214 OFFICE O/P EST MOD 30 MIN: CPT | Performed by: FAMILY MEDICINE

## 2022-07-15 PROCEDURE — 84402 ASSAY OF FREE TESTOSTERONE: CPT

## 2022-07-15 NOTE — PROGRESS NOTES
Assessment/Plan:  Roland Spence was seen today for follow-up  Diagnoses and all orders for this visit:    Overweight    Patient is actually doing quite well despite the slight increase in weight from her last visit  Review of her body composition analysis her muscle mass is within the high normal range and her fat mass is just slightly above normal   This is likely due to her level of activity at the gym  Her caloric intake is adequate  I highly recommended she continue with her current caloric goals and continue going to the gym  I recommended repeat body composition analysis in six months followed by follow-up with me and she was in agreement  Goals:  Food log (ie ) www myfitnesspal com,sparkpeople  com,loseit com,calorieking  com,etc  , No sugary beverages  At least 64oz of water daily  , Increase physical activity by 10 minutes daily, Gradually increase physical activity to a goal of 5 days per week for 30 minutes of MODERATE intensity PLUS 2 days per week of FULL BODY resistance training and 6756-6165 calories per day    Total time spent: 30 minutes with >50% face-to-face time with the patient  Follow up in approximately 6 months with Non-Surgical Physician/Advanced Practitioner  Subjective:   Chief Complaint   Patient presents with    Follow-up     MWM 3mth f/u; waist 35in       Patient ID: Latoya Obrien  is a 34 y o  female with excess weight/obesity here to pursue weight management  Patient is pursuing Conservative Program    Most recent notes and records were reviewed  Patient presents for medical weight management follow-up  Not currently on any medication for weight loss  Does exercise regularly  Did have a visit with the dietitian and had a body composition analysis performed    Was told she has higher muscle mass and that her fat mass is elevated but not alarmingly high       -Initial weight loss goal of 5-10% weight loss for improved health  Wt Readings from Last 10 Encounters: 07/15/22 72 6 kg (160 lb)   05/18/22 71 8 kg (158 lb 6 4 oz)   05/06/22 71 2 kg (157 lb)   04/29/22 72 4 kg (159 lb 9 6 oz)   04/15/22 71 4 kg (157 lb 4 8 oz)   12/06/21 74 8 kg (165 lb)   11/23/21 74 8 kg (165 lb)   10/26/21 74 8 kg (165 lb)   09/23/21 73 1 kg (161 lb 3 2 oz)   09/17/21 73 kg (161 lb)         Food logging - 1200cal goal  B- oikos triple zero yogurt w/ granola or eggs w/ veggies  S- granola bar or fruit  L- salad w/ veggies and egg or avacado or chicken w/ oil and vinegar dressing  S- granola bar or premier protein shake  D- protein, veggie and starch  S- rare ice cream  Drinks- 1 gal of water per day, 8oz coffee w/ sweetened oatmilk  Alcohol- rare  Exercise- 4-5x/wk for 1hr mostly weight training      The following portions of the patient's history were reviewed and updated as appropriate: allergies, current medications, past family history, past medical history, past social history, past surgical history, and problem list     Review of Systems   Constitutional: Negative for fever  Respiratory: Negative for shortness of breath  Cardiovascular: Negative for chest pain  Objective:  /70   Pulse 82   Resp 14   Ht 5' 2 75" (1 594 m)   Wt 72 6 kg (160 lb)   Breastfeeding No   BMI 28 57 kg/m²   Constitutional: Well-developed, well-nourished and Overweight Body mass index is 28 57 kg/m²  [place] HEENT: No conjunctival injection  Pulmonary: No increased work of breathing or signs of respiratory distress  CV: Well-perfused  GI: Overweight  Non-distended  MSK: No edema   Neuro: Oriented to person, place and time  Normal Speech  Normal gait  Psych: Normal affect and mood       Labs and Imaging  Most recent labs and imaging reviewed

## 2022-07-16 LAB
TESTOST FREE SERPL-MCNC: 2.4 PG/ML (ref 0–4.2)
TESTOST SERPL-MCNC: 39 NG/DL (ref 8–60)

## 2022-07-18 LAB — 1,25(OH)2D3 SERPL-MCNC: 60.9 PG/ML (ref 24.8–81.5)

## 2022-08-08 ENCOUNTER — OFFICE VISIT (OUTPATIENT)
Dept: URGENT CARE | Age: 32
End: 2022-08-08
Payer: COMMERCIAL

## 2022-08-08 VITALS
DIASTOLIC BLOOD PRESSURE: 64 MMHG | OXYGEN SATURATION: 98 % | TEMPERATURE: 97.5 F | HEART RATE: 91 BPM | RESPIRATION RATE: 18 BRPM | SYSTOLIC BLOOD PRESSURE: 108 MMHG

## 2022-08-08 DIAGNOSIS — J39.2 ERYTHEMA OF PHARYNX: Primary | ICD-10-CM

## 2022-08-08 LAB — S PYO AG THROAT QL: NEGATIVE

## 2022-08-08 PROCEDURE — 99213 OFFICE O/P EST LOW 20 MIN: CPT | Performed by: STUDENT IN AN ORGANIZED HEALTH CARE EDUCATION/TRAINING PROGRAM

## 2022-08-08 PROCEDURE — 87070 CULTURE OTHR SPECIMN AEROBIC: CPT | Performed by: STUDENT IN AN ORGANIZED HEALTH CARE EDUCATION/TRAINING PROGRAM

## 2022-08-08 PROCEDURE — 87880 STREP A ASSAY W/OPTIC: CPT | Performed by: STUDENT IN AN ORGANIZED HEALTH CARE EDUCATION/TRAINING PROGRAM

## 2022-08-08 RX ORDER — PENICILLIN V POTASSIUM 500 MG/1
500 TABLET ORAL EVERY 8 HOURS SCHEDULED
Qty: 30 TABLET | Refills: 0 | Status: SHIPPED | OUTPATIENT
Start: 2022-08-08 | End: 2022-08-18

## 2022-08-08 RX ORDER — LIDOCAINE HYDROCHLORIDE 20 MG/ML
15 SOLUTION OROPHARYNGEAL 4 TIMES DAILY PRN
Qty: 100 ML | Refills: 0 | Status: SHIPPED | OUTPATIENT
Start: 2022-08-08

## 2022-08-08 NOTE — LETTER
August 8, 2022     Patient: Rodrigo Sylvester   YOB: 1990   Date of Visit: 8/8/2022       To Whom it May Concern:    Milan Shawna was seen in my clinic on 8/8/2022  She may return to work on 08/10  If you have any questions or concerns, please don't hesitate to call           Sincerely,          CHERYL LAZO        CC: No Recipients

## 2022-08-10 LAB — BACTERIA THROAT CULT: NORMAL

## 2022-11-02 ENCOUNTER — OFFICE VISIT (OUTPATIENT)
Dept: INTERNAL MEDICINE CLINIC | Facility: OTHER | Age: 32
End: 2022-11-02

## 2022-11-02 ENCOUNTER — APPOINTMENT (OUTPATIENT)
Dept: LAB | Facility: IMAGING CENTER | Age: 32
End: 2022-11-02

## 2022-11-02 VITALS
HEART RATE: 83 BPM | DIASTOLIC BLOOD PRESSURE: 78 MMHG | HEIGHT: 63 IN | OXYGEN SATURATION: 99 % | TEMPERATURE: 98.4 F | SYSTOLIC BLOOD PRESSURE: 110 MMHG | BODY MASS INDEX: 28.74 KG/M2 | WEIGHT: 162.2 LBS

## 2022-11-02 DIAGNOSIS — N92.6 IRREGULAR MENSES: ICD-10-CM

## 2022-11-02 DIAGNOSIS — E04.1 THYROID NODULE: ICD-10-CM

## 2022-11-02 DIAGNOSIS — Z13.0 SCREENING FOR DEFICIENCY ANEMIA: ICD-10-CM

## 2022-11-02 DIAGNOSIS — Z00.00 ANNUAL PHYSICAL EXAM: Primary | ICD-10-CM

## 2022-11-02 DIAGNOSIS — Z23 NEEDS FLU SHOT: ICD-10-CM

## 2022-11-02 PROBLEM — U07.1 COVID-19 VIRUS DETECTED: Status: RESOLVED | Noted: 2020-06-19 | Resolved: 2022-11-02

## 2022-11-02 PROBLEM — O09.299 HX OF MATERNAL LACERATION, 4TH DEGREE, CURRENTLY PREGNANT: Status: RESOLVED | Noted: 2019-12-09 | Resolved: 2022-11-02

## 2022-11-02 LAB
BASOPHILS # BLD AUTO: 0.04 THOUSANDS/ÂΜL (ref 0–0.1)
BASOPHILS NFR BLD AUTO: 0 % (ref 0–1)
EOSINOPHIL # BLD AUTO: 0.06 THOUSAND/ÂΜL (ref 0–0.61)
EOSINOPHIL NFR BLD AUTO: 1 % (ref 0–6)
ERYTHROCYTE [DISTWIDTH] IN BLOOD BY AUTOMATED COUNT: 15 % (ref 11.6–15.1)
HCT VFR BLD AUTO: 40.6 % (ref 34.8–46.1)
HGB BLD-MCNC: 12.9 G/DL (ref 11.5–15.4)
IMM GRANULOCYTES # BLD AUTO: 0.03 THOUSAND/UL (ref 0–0.2)
IMM GRANULOCYTES NFR BLD AUTO: 0 % (ref 0–2)
LYMPHOCYTES # BLD AUTO: 2.32 THOUSANDS/ÂΜL (ref 0.6–4.47)
LYMPHOCYTES NFR BLD AUTO: 25 % (ref 14–44)
MCH RBC QN AUTO: 28.5 PG (ref 26.8–34.3)
MCHC RBC AUTO-ENTMCNC: 31.8 G/DL (ref 31.4–37.4)
MCV RBC AUTO: 90 FL (ref 82–98)
MONOCYTES # BLD AUTO: 0.43 THOUSAND/ÂΜL (ref 0.17–1.22)
MONOCYTES NFR BLD AUTO: 5 % (ref 4–12)
NEUTROPHILS # BLD AUTO: 6.27 THOUSANDS/ÂΜL (ref 1.85–7.62)
NEUTS SEG NFR BLD AUTO: 69 % (ref 43–75)
NRBC BLD AUTO-RTO: 0 /100 WBCS
PLATELET # BLD AUTO: 247 THOUSANDS/UL (ref 149–390)
PMV BLD AUTO: 11.3 FL (ref 8.9–12.7)
RBC # BLD AUTO: 4.52 MILLION/UL (ref 3.81–5.12)
TSH SERPL DL<=0.05 MIU/L-ACNC: 0.9 UIU/ML (ref 0.45–4.5)
WBC # BLD AUTO: 9.15 THOUSAND/UL (ref 4.31–10.16)

## 2022-11-02 NOTE — PROGRESS NOTES
ADULT ANNUAL [address] [facility] PRIMARY CARE [place]    NAME: Onesimo Azevedo  AGE: 34 y o  SEX: female  : 1990     DATE: 2022     Assessment and Plan:     Problem List Items Addressed This Visit        Endocrine    Thyroid nodule     - check thyroid ultrasound and TSH         Relevant Orders    TSH, 3rd generation with Free T4 reflex    US thyroid       Other    Annual physical exam - Primary     - healthy 50-year-old female  - influenza vaccine given today  - up-to-date on preventative care  - continue healthy diet and routine exercise  - follow-up in 1 year, sooner as needed         Irregular menses     - check TSH  - likely secondary to her PCOS, follows with OBGYN  - currently on oral contraceptive         Relevant Orders    TSH, 3rd generation with Free T4 reflex      Other Visit Diagnoses     Needs flu shot        Relevant Orders    FLUZONE: influenza vaccine, quadrivalent, 0 5 mL (Completed)    Screening for deficiency anemia        Relevant Orders    CBC and differential        Immunizations and preventive care screenings were discussed with patient today  Appropriate education was printed on patient's after visit summary  Return in about 1 year (around 2023) for Annual physical      Chief Complaint:     Chief Complaint   Patient presents with   • Physical Exam     Pt here for yearly visit      • Flu Vaccine     Pt here for flu shot        History of Present Illness:     Adult Annual Physical   Patient here for a comprehensive physical exam  The patient reports no problems  She currently is having more anxiety lately secondary to her 3year old son having seizures  They are working with [facility]  Diet and Physical Activity  · Diet/Nutrition: well balanced diet  · Exercise: 5-7 times a week on average  Mix of strength training and cardio         Depression Screening  PHQ-2/9 Depression Screening         General Health  · Sleep: sleeps poorly  Under a lot of stress due to her 2 yr old son having seizures during the night  · Hearing: normal - bilateral   · Vision: most recent eye exam >1 year ago and wears glasses  · Dental: regular dental visits  /GYN Health  · Last menstrual period: September 2022  Missed her last cycle  Pregnancy test negative   · Contraceptive method: oral contraceptives  · History of STDs?: no    · PAP/HPV: 5/6/2022 negative/negative      Review of Systems:     Review of Systems   Constitutional: Negative for activity change, appetite change, chills, diaphoresis, fatigue, fever and unexpected weight change  Eyes: Negative for visual disturbance  Respiratory: Negative for cough, chest tightness, shortness of breath and wheezing  Cardiovascular: Negative for chest pain and palpitations  Gastrointestinal: Negative for abdominal distention, abdominal pain, blood in stool, constipation, diarrhea, nausea and vomiting  Genitourinary: Positive for menstrual problem (irregular menses)  Negative for difficulty urinating, dysuria, frequency, hematuria, vaginal bleeding, vaginal discharge and vaginal pain  Musculoskeletal: Negative for arthralgias, joint swelling and myalgias  Skin: Negative for rash  Neurological: Negative for dizziness, seizures, syncope, light-headedness and headaches  Psychiatric/Behavioral: Positive for dysphoric mood (situational with her sons seizures) and sleep disturbance  The patient is nervous/anxious  Past Medical History:     Past Medical History:   Diagnosis Date   • Abnormal Pap smear of cervix    • Anemia    • Anxiety    • Asthma    • COVID-19 virus detected 6/19/2020    Formatting of this note might be different from the original  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 all employees- pt completely asymptomatic  Tested positive on 6/16/2020  Staff must wear proper PPE at pt vero'ts - mask, gown, gloves     Last Assessment & Plan:  Formatting of this note might be different from the original  Tested positive 20 Denies symptoms [place]   • Depression    • Diabetes mellitus ([facility] [address] )     Gestational DM   • IBS (irritable bowel syndrome)    • PCOS (polycystic ovarian syndrome)       Past Surgical History:     Past Surgical History:   Procedure Laterality Date   •  SECTION  2020   • COLONOSCOPY     • WISDOM TOOTH EXTRACTION        Social History:     Social History     Socioeconomic History   • Marital status: Single     Spouse name: None   • Number of children: None   • Years of education: None   • Highest education level: None   Occupational History   • None   Tobacco Use   • Smoking status: Never Smoker   • Smokeless tobacco: Never Used   Vaping Use   • Vaping Use: Never used   Substance and Sexual Activity   • Alcohol use: Yes     Comment: rarely   • Drug use: Not Currently   • Sexual activity: Yes     Partners: Male     Birth control/protection: None   Other Topics Concern   • None   Social History Narrative   • None     Social Determinants of Health     Financial Resource Strain: Not on file   Food Insecurity: Not on file   Transportation Needs: Not on file   Physical Activity: Not on file   Stress: Not on file   Social Connections: Not on file   Intimate Partner Violence: Not on file   Housing Stability: Not on file      Family History:     Family History   Problem Relation Age of Onset   • No Known Problems Mother    • No Known Problems Father    • No Known Problems Brother    • Diabetes Maternal Grandmother    • No Known Problems Brother    • Dementia Maternal Grandfather    • Diabetes Paternal Grandmother    • No Known Problems Paternal Grandfather    • No Known Problems Son    • No Known Problems Son    • Colon cancer Neg Hx    • Breast cancer Neg Hx    • Ovarian cancer Neg Hx       Current Medications:     Current Outpatient Medications   Medication Sig Dispense Refill   • norethindrone-ethinyl estradiol (Loestrin Fe 1/20) 1-20 MG-MCG per tablet Take 1 tablet by mouth daily 84 tablet 3     No current facility-administered medications for this visit  Allergies:     No Known Allergies   Physical Exam:     /78 (BP Location: Left arm, Patient Position: Sitting, Cuff Size: Standard)   Pulse 83   Temp 98 4 °F (36 9 °C) (Temporal)   Ht 5' 2 5" (1 588 m)   Wt 73 6 kg (162 lb 3 2 oz)   SpO2 99% Comment: room air  BMI 29 19 kg/m²     Physical Exam  Vitals reviewed  Constitutional:       General: She is not in acute distress  Appearance: Normal appearance  She is not diaphoretic  HENT:      Head: Normocephalic and atraumatic  Right Ear: Tympanic membrane and external ear normal       Left Ear: Tympanic membrane and external ear normal       Nose: Nose normal  No congestion or rhinorrhea  Mouth/Throat:      Mouth: Mucous membranes are moist       Pharynx: Oropharynx is clear  No oropharyngeal exudate or posterior oropharyngeal erythema  Eyes:      Extraocular Movements: Extraocular movements intact  Conjunctiva/sclera: Conjunctivae normal       Pupils: Pupils are equal, round, and reactive to light  Neck:      Thyroid: No thyroid mass, thyromegaly or thyroid tenderness  Vascular: No carotid bruit  Cardiovascular:      Rate and Rhythm: Normal rate and regular rhythm  Heart sounds: Normal heart sounds  No murmur heard  Pulmonary:      Effort: Pulmonary effort is normal  No respiratory distress  Breath sounds: Normal breath sounds  No wheezing, rhonchi or rales  Chest:   Breasts:      Right: No axillary adenopathy or supraclavicular adenopathy  Left: No axillary adenopathy or supraclavicular adenopathy  Abdominal:      General: Bowel sounds are normal  There is no distension  Palpations: Abdomen is soft  There is no mass  Tenderness: There is no abdominal tenderness  There is no guarding     Musculoskeletal:         General: Normal range of motion  Cervical back: Neck supple  Lymphadenopathy:      Cervical: No cervical adenopathy  Upper Body:      Right upper body: No supraclavicular, axillary, pectoral or epitrochlear adenopathy  Left upper body: No supraclavicular, axillary, pectoral or epitrochlear adenopathy  Skin:     General: Skin is warm and dry  Neurological:      Mental Status: She is alert and oriented to person, place, and time  Mental status is at baseline  Motor: No weakness  Gait: Gait normal    Psychiatric:         Mood and Affect: Mood normal          Behavior: Behavior normal          Thought Content:  Thought content normal          Judgment: Judgment normal           Estevan Perry, [address] [facility]IMARY [facility]ARE [place]

## 2022-11-02 NOTE — PATIENT INSTRUCTIONS

## 2022-11-02 NOTE — ASSESSMENT & PLAN NOTE
- healthy 23-year-old female  - influenza vaccine given today  - up-to-date on preventative care  - continue healthy diet and routine exercise  - follow-up in 1 year, sooner as needed

## 2022-12-05 ENCOUNTER — HOSPITAL ENCOUNTER (OUTPATIENT)
Dept: RADIOLOGY | Facility: IMAGING CENTER | Age: 32
Discharge: HOME | End: 2022-12-05

## 2022-12-05 DIAGNOSIS — E04.1 THYROID NODULE: ICD-10-CM

## 2023-01-20 ENCOUNTER — OFFICE VISIT (OUTPATIENT)
Dept: BARIATRICS | Facility: CLINIC | Age: 33
End: 2023-01-20

## 2023-01-20 VITALS
SYSTOLIC BLOOD PRESSURE: 106 MMHG | HEIGHT: 63 IN | HEART RATE: 84 BPM | DIASTOLIC BLOOD PRESSURE: 70 MMHG | BODY MASS INDEX: 29.15 KG/M2 | WEIGHT: 164.5 LBS | RESPIRATION RATE: 16 BRPM

## 2023-01-20 VITALS — HEIGHT: 64 IN | BODY MASS INDEX: 28.09 KG/M2 | WEIGHT: 164.5 LBS

## 2023-01-20 DIAGNOSIS — E28.2 POLYCYSTIC OVARIAN SYNDROME: ICD-10-CM

## 2023-01-20 DIAGNOSIS — E66.3 OVERWEIGHT: Primary | ICD-10-CM

## 2023-01-20 DIAGNOSIS — E04.1 THYROID NODULE: ICD-10-CM

## 2023-01-20 DIAGNOSIS — R63.5 ABNORMAL WEIGHT GAIN: ICD-10-CM

## 2023-01-20 RX ORDER — METFORMIN HYDROCHLORIDE 750 MG/1
750 TABLET, EXTENDED RELEASE ORAL
Qty: 30 TABLET | Refills: 2 | Status: SHIPPED | OUTPATIENT
Start: 2023-01-20

## 2023-01-20 RX ORDER — CLINDAMYCIN PHOSPHATE 10 UG/ML
LOTION TOPICAL
COMMUNITY
Start: 2022-12-07

## 2023-01-20 NOTE — PROGRESS NOTES
Assessment/Plan:     Overweight  - Patient is pursuing Conservative Program with body comp testing   - Initial weight loss goal of 5-10% weight loss for improved health  - Topamax in the past caused mood changes  - Wellbutrin in the past - no difference  - Phentermine in the past was effective at first, but then lost efficacy  - Continues to watch diet and exercises regularly, but is frustrated with not losing weight and has gained a bit since last visit  - Struggles with appetite and cravings, particularly around the catamenial time  Given PCOS, discussed starting metformin to see if that helps and she was agreeable  - Side effects of metformin discussed: nausea, vomiting, constipation, and diarrhea  - Start Metformin  mg daily    - Labs reviewed: CBC and TSH 11/2/2022 were all within acceptable range  Initial: 157 2 lbs BMI 28 13  Current: 164 5 lbs BMI 29 61  Change: +7 3 lbs    Goals:  Keep up the great work with food logging  2028-7450 calories on sedentary days and flex to 1400 on active days  Keep up the great work with water intake  Keep up the great work with exercise  Start metformin XR  Thyroid nodule  - Last thyroid ultrasound 12/5/2022  Stable thyroid nodule  No nodule meets current ACR criteria for requiring biopsy or followup ultrasounds  Polycystic ovarian syndrome  - Taking OCPs and starting metformin  Continue management with prescribing provider  Lizeth Roblero was seen today for follow-up  Diagnoses and all orders for this visit:    Overweight  -     metFORMIN (GLUCOPHAGE-XR) 750 mg 24 hr tablet; Take 1 tablet (750 mg total) by mouth daily with breakfast    Polycystic ovarian syndrome  -     metFORMIN (GLUCOPHAGE-XR) 750 mg 24 hr tablet; Take 1 tablet (750 mg total) by mouth daily with breakfast    Thyroid nodule            Follow up in approximately 2 months with Non-Surgical Physician/Advanced Practitioner      Subjective:   Chief Complaint   Patient presents with   • Follow-up     MWM 6mth f/u; waist 33 5in       Patient ID: Alice Morse  is a 34 y o  female with excess weight/obesity here to pursue weight management  Patient is pursuing Conservative Program    Most recent notes and records were reviewed  HPI    Wt Readings from Last 10 Encounters:   01/20/23 74 6 kg (164 lb 8 oz)   01/20/23 74 7 kg (164 lb 9 3 oz)   11/02/22 73 6 kg (162 lb 3 2 oz)   07/15/22 72 6 kg (160 lb)   05/18/22 71 8 kg (158 lb 6 4 oz)   05/06/22 71 2 kg (157 lb)   04/29/22 72 4 kg (159 lb 9 6 oz)   04/15/22 71 4 kg (157 lb 4 8 oz)   12/06/21 74 8 kg (165 lb)   11/23/21 74 8 kg (165 lb)     Appetite tends to go up around catamenial time and has cravings for sweets the entire week  Continues to watch diet and exercise, but is frustrated with gaining some weight  Has been food logging  Getting 2122-5235 calories per day  Was previously on Topamax and phentermine and had mood changes  Then changed to phentermine and Wellbutrin - no difference with appetite  Tried phentermine alone, was helpful at first, but then lost efficacy       Hydration- 1 gallon water, 1 cup coffee with 2 tsp almond milk creamer  Alcohol- none  Exercise- gym 5-6 days per week 1 hour - treadmill 20-30 minutes, weights and stepper remainder of time  Occupation- [employer]   Sleep- 8 hours      Colonoscopy: had 2021, not due again until age 38 unless new symptoms  Mammogram: N/A      The following portions of the patient's history were reviewed and updated as appropriate: allergies, current medications, past family history, past medical history, past social history, past surgical history, and problem list     Family History   Problem Relation Age of Onset   • No Known Problems Mother    • No Known Problems Father    • No Known Problems Brother    • Diabetes Maternal Grandmother    • No Known Problems Brother    • Dementia Maternal Grandfather    • Diabetes Paternal Grandmother    • No Known Problems Paternal Grandfather    • No Known Problems Son    • No Known Problems Son    • Colon cancer Neg Hx    • Breast cancer Neg Hx    • Ovarian cancer Neg Hx         Review of Systems   HENT: Negative for sore throat  Respiratory: Negative for cough and shortness of breath  Cardiovascular: Negative for chest pain and palpitations  Gastrointestinal: Positive for constipation  Negative for abdominal pain, diarrhea, nausea and vomiting         + GERD diet controlled   Musculoskeletal: Negative for arthralgias and back pain  Skin: Negative for rash  Psychiatric/Behavioral: Negative for suicidal ideas (or HI)  + depression and anxiety situational        Objective:  /70   Pulse 84   Resp 16   Ht 5' 2 5" (1 588 m)   Wt 74 6 kg (164 lb 8 oz)   BMI 29 61 kg/m²     Physical Exam  Vitals and nursing note reviewed  Constitutional   General appearance: Abnormal   well developed and overweight  Eyes No conjunctival injection  Ears, Nose, Mouth, and Throat Oral mucosa moist    Pulmonary   Respiratory effort: No increased work of breathing or signs of respiratory distress  Cardiovascular   Examination of extremities for edema and/or varicosities: Normal   no edema  Abdomen   Abdomen: Abnormal overweight       Musculoskeletal   Normal range of motion  Neurological   Gait and station: Normal    Psychiatric   Orientation to person, place and time: Normal     Affect: appropriate

## 2023-01-20 NOTE — ASSESSMENT & PLAN NOTE
- Last thyroid ultrasound 12/5/2022  Stable thyroid nodule  No nodule meets current ACR criteria for requiring biopsy or followup ultrasounds

## 2023-01-20 NOTE — ASSESSMENT & PLAN NOTE
- Patient is pursuing Conservative Program with body comp testing   - Initial weight loss goal of 5-10% weight loss for improved health  - Topamax in the past caused mood changes  - Wellbutrin in the past - no difference  - Phentermine in the past was effective at first, but then lost efficacy  - Continues to watch diet and exercises regularly, but is frustrated with not losing weight and has gained a bit since last visit  - Struggles with appetite and cravings, particularly around the catamenial time  Given PCOS, discussed starting metformin to see if that helps and she was agreeable  - Side effects of metformin discussed: nausea, vomiting, constipation, and diarrhea  - Start Metformin  mg daily    - Labs reviewed: CBC and TSH 11/2/2022 were all within acceptable range  Initial: 157 2 lbs BMI 28 13  Current: 164 5 lbs BMI 29 61  Change: +7 3 lbs    Goals:  Keep up the great work with food logging  5426-6760 calories on sedentary days and flex to 1400 on active days  Keep up the great work with water intake  Keep up the great work with exercise  Start metformin XR

## 2023-03-24 ENCOUNTER — OFFICE VISIT (OUTPATIENT)
Dept: BARIATRICS | Facility: CLINIC | Age: 33
End: 2023-03-24

## 2023-03-24 ENCOUNTER — TELEPHONE (OUTPATIENT)
Dept: BARIATRICS | Facility: CLINIC | Age: 33
End: 2023-03-24

## 2023-03-24 VITALS
SYSTOLIC BLOOD PRESSURE: 106 MMHG | HEIGHT: 64 IN | BODY MASS INDEX: 28.03 KG/M2 | DIASTOLIC BLOOD PRESSURE: 78 MMHG | HEART RATE: 88 BPM | WEIGHT: 164.2 LBS | RESPIRATION RATE: 16 BRPM

## 2023-03-24 DIAGNOSIS — E28.2 POLYCYSTIC OVARIAN SYNDROME: ICD-10-CM

## 2023-03-24 DIAGNOSIS — E66.3 OVERWEIGHT: Primary | ICD-10-CM

## 2023-03-24 DIAGNOSIS — E04.1 THYROID NODULE: ICD-10-CM

## 2023-03-24 RX ORDER — SPIRONOLACTONE 50 MG/1
50 TABLET, FILM COATED ORAL DAILY
COMMUNITY
Start: 2023-03-14

## 2023-03-24 RX ORDER — DOXYCYCLINE HYCLATE 20 MG
20 TABLET ORAL 2 TIMES DAILY
COMMUNITY
Start: 2023-03-14

## 2023-03-24 NOTE — TELEPHONE ENCOUNTER
Patient and pharm was informed regarding the [facility] approval from 03/24/2023-03/24/2024  She was also informed about the 4-5% weight loss requirement for renewal purposes

## 2023-03-24 NOTE — PATIENT INSTRUCTIONS
I am sending the [facility]TERE ASHISH to your pharmacy  This will start the preauthorization process  My office takes care of that  It can take up to 3 weeks to process  Start Wegovy 0 25 mg subcutaneously once a week for 4 weeks, then increase to 0 5 mg subcutaneously each week  After you take the second pen of the starting dose of 0 25 mg, please message me to submit the next dose of 0 5 mg, as we will also likely need to do another preauth for that one       - Side effects of Wegovy discussed: nausea, vomiting, diarrhea, and constipation  If severe abdominal pain develops, stop Wegovy and go to the ER, as this could be pancreatitis  Monitor heart rate while on Wegovy and if resting heart rate greater than 100 beats per minute, patient will notify me

## 2023-03-24 NOTE — PROGRESS NOTES
Assessment/Plan:     Overweight  - Patient is pursuing Conservative Program with body comp testing   - Initial weight loss goal of 5-10% weight loss for improved health  - On OCP  - Topamax in the past caused mood changes  - Wellbutrin in the past - no difference with appetite  - Phentermine in the past was effective at first, but then lost efficacy  - Continues to watch diet and exercises regularly, but is frustrated with not losing weight  - Metformin started last office visit to try to help with appetite given history of PCOS  - Currently taking metformin  mg daily  No negative side effects, but not helping with appetite  Discussed increasing metformin versus starting Le mars or Saxenda, and she would like to go ahead and start Le mars  - Thyroid ultrasound 12/5/2022: Stable thyroid nodule  No nodule meets current ACR criteria for requiring biopsy or followup ultrasounds  - Patient denies personal history of pancreatitis  Patient also denies personal and family history of thyroid cancer and multiple endocrine neoplasia type 2 (MEN 2 tumor)  - Reviewed medullary thyroid cancer risk and GLP-1 therapy, but she would like to continue with plan for Le mars  She will update me if she notices an increase in thyroid nodule size  - Side effects of Wegovy discussed: nausea, vomiting, diarrhea, and constipation  If severe abdominal pain develops, stop Wegovy and go to the ER, as this could be pancreatitis  Monitor heart rate while on Wegovy and if resting heart rate greater than 100 beats per minute, patient will notify me  - Stop metformin   - Start Wegovy, weight 164 1 lbs  - Check A1C, lipid panel, and CMP     Initial: 157 2 lbs BMI 28 13  Last visit: 164 5 lbs BMI 29 61  Current: 164 1 lbs BMI 28 63  Change: +6 9 lbs (-0 4 lbs since the last office visit)    Goals:  Keep up the great work with food logging  4658-5971 calories on sedentary days and flex to 1400 on active days    Keep up the great work with water intake  Keep up the great work with exercise  Stop metformin  Start UBAXQV    Polycystic ovarian syndrome  - Taking OCPs  Continue management with prescribing provider  Thyroid nodule  - Last thyroid ultrasound 12/5/2022  Stable thyroid nodule  No nodule meets current ACR criteria for requiring biopsy or followup ultrasounds  Waqas Starkey was seen today for follow-up  Diagnoses and all orders for this visit:    Overweight  -     Semaglutide-Weight Management (WEGOVY) 0 25 MG/0 5ML; Inject 0 5 mL (0 25 mg total) under the skin once a week  -     Comprehensive metabolic panel; Future  -     HEMOGLOBIN A1C W/ EAG ESTIMATION; Future  -     Lipid Panel with Direct LDL reflex; Future    Polycystic ovarian syndrome  -     Semaglutide-Weight Management (WEGOVY) 0 25 MG/0 5ML; Inject 0 5 mL (0 25 mg total) under the skin once a week  -     Comprehensive metabolic panel; Future  -     HEMOGLOBIN A1C W/ EAG ESTIMATION; Future  -     Lipid Panel with Direct LDL reflex; Future    Thyroid nodule          Follow up in approximately 2-3 months with Non-Surgical Physician/Advanced Practitioner  Subjective:   Chief Complaint   Patient presents with   • Follow-up     MWM 2mth f/u; waist 33 5in       Patient ID: Bhavna Claudio  is a 28 y o  female with excess weight/obesity here to pursue weight management  Patient is pursuing Conservative Program    Most recent notes and records were reviewed  HPI    Wt Readings from Last 10 Encounters:   03/24/23 74 5 kg (164 lb 3 2 oz)   01/20/23 74 6 kg (164 lb 8 oz)   01/20/23 74 6 kg (164 lb 8 oz)   11/02/22 73 6 kg (162 lb 3 2 oz)   07/15/22 72 6 kg (160 lb)   05/18/22 71 8 kg (158 lb 6 4 oz)   05/06/22 71 2 kg (157 lb)   04/29/22 72 4 kg (159 lb 9 6 oz)   04/15/22 71 4 kg (157 lb 4 8 oz)   12/06/21 74 8 kg (165 lb)     Taking metformin  mg once daily  No negative side effects, but no difference with appetite       Was previously on Topamax and phentermine and had mood changes  Then changed to phentermine and Wellbutrin - no difference with appetite  Tried phentermine alone, was helpful at first, but then lost efficacy  Appetite tends to go up around catamenial time and has cravings for sweets the entire week  Continues to watch diet and exercise, but is frustrated that she is not losing weight  Has been food logging  Getting 4496-0912 calories per day  Hydration- 1 gallon water, 1 cup coffee with 2 tsp almond milk creamer  Alcohol- none  Exercise- gym 5-6 days per week 1 hour - treadmill 20-30 minutes, weights and stepper remainder of time  Occupation- [employer]   Sleep- 8 hours    Colonoscopy: had 2021, not due again until age 38 unless new symptoms  Mammogram: N/A      The following portions of the patient's history were reviewed and updated as appropriate: allergies, current medications, past family history, past medical history, past social history, past surgical history, and problem list     Family History   Problem Relation Age of Onset   • No Known Problems Mother    • No Known Problems Father    • No Known Problems Brother    • Diabetes Maternal Grandmother    • No Known Problems Brother    • Dementia Maternal Grandfather    • Diabetes Paternal Grandmother    • No Known Problems Paternal Grandfather    • No Known Problems Son    • No Known Problems Son    • Colon cancer Neg Hx    • Breast cancer Neg Hx    • Ovarian cancer Neg Hx         Review of Systems   HENT: Negative for sore throat  Respiratory: Negative for cough and shortness of breath  Cardiovascular: Negative for chest pain and palpitations  Gastrointestinal: Positive for constipation  Negative for abdominal pain, diarrhea, nausea and vomiting         + GERD diet controlled and PRN tums   Musculoskeletal: Negative for arthralgias and back pain  Skin: Negative for rash  Psychiatric/Behavioral: Negative for suicidal ideas (or HI)          + depression and anxiety        Objective:  /78   Pulse 88   Resp 16   Ht 5' 3 5" (1 613 m)   Wt 74 5 kg (164 lb 3 2 oz)   BMI 28 63 kg/m²     Physical Exam  Vitals and nursing note reviewed  Constitutional   General appearance: Abnormal   well developed and overweight  Eyes No conjunctival injection  Ears, Nose, Mouth, and Throat Oral mucosa moist    Pulmonary   Respiratory effort: No increased work of breathing or signs of respiratory distress  Cardiovascular   Examination of extremities for edema and/or varicosities: Normal   no edema  Abdomen   Abdomen: Abnormal overweight       Musculoskeletal   Normal range of motion  Neurological   Gait and station: Normal    Psychiatric   Orientation to person, place and time: Normal     Affect: appropriate

## 2023-03-24 NOTE — ASSESSMENT & PLAN NOTE
- Patient is pursuing Conservative Program with body comp testing   - Initial weight loss goal of 5-10% weight loss for improved health  - On OCP  - Topamax in the past caused mood changes  - Wellbutrin in the past - no difference with appetite  - Phentermine in the past was effective at first, but then lost efficacy  - Continues to watch diet and exercises regularly, but is frustrated with not losing weight  - Metformin started last office visit to try to help with appetite given history of PCOS  - Currently taking metformin  mg daily  No negative side effects, but not helping with appetite  Discussed increasing metformin versus starting Le mars or Saxenda, and she would like to go ahead and start Le mars  - Thyroid ultrasound 12/5/2022: Stable thyroid nodule  No nodule meets current ACR criteria for requiring biopsy or followup ultrasounds  - Patient denies personal history of pancreatitis  Patient also denies personal and family history of thyroid cancer and multiple endocrine neoplasia type 2 (MEN 2 tumor)  - Reviewed medullary thyroid cancer risk and GLP-1 therapy, but she would like to continue with plan for Le mars  She will update me if she notices an increase in thyroid nodule size  - Side effects of Wegovy discussed: nausea, vomiting, diarrhea, and constipation  If severe abdominal pain develops, stop Wegovy and go to the ER, as this could be pancreatitis  Monitor heart rate while on Wegovy and if resting heart rate greater than 100 beats per minute, patient will notify me  - Stop metformin   - Start Wegovy, weight 164 1 lbs  - Check A1C, lipid panel, and CMP     Initial: 157 2 lbs BMI 28 13  Last visit: 164 5 lbs BMI 29 61  Current: 164 1 lbs BMI 28 63  Change: +6 9 lbs (-0 4 lbs since the last office visit)    Goals:  Keep up the great work with food logging  9055-2107 calories on sedentary days and flex to 1400 on active days  Keep up the great work with water intake    Keep up the great work with exercise    Stop metformin  Start [facility]TERE HINOJOSA

## 2023-04-23 ENCOUNTER — PATIENT MESSAGE (OUTPATIENT)
Dept: BARIATRICS | Facility: CLINIC | Age: 33
End: 2023-04-23

## 2023-04-24 NOTE — TELEPHONE ENCOUNTER
Tino Pro 4/24/2023 6:56 AM EDT      ----- Message -----  From: Justine Bryan  Sent: 4/23/2023 5:58 PM EDT  To: , *  Subject: Next dose of wegovy     Hi,    I took the 3rd shot today and was writing to request the next dosage be submitted to insurance for approval      Thank you,  Nury Carrasco

## 2023-05-25 ENCOUNTER — PATIENT MESSAGE (OUTPATIENT)
Dept: BARIATRICS | Facility: CLINIC | Age: 33
End: 2023-05-25

## 2023-05-25 DIAGNOSIS — E66.3 OVERWEIGHT: Primary | ICD-10-CM

## 2023-05-25 DIAGNOSIS — E28.2 POLYCYSTIC OVARIAN SYNDROME: ICD-10-CM

## 2023-05-25 NOTE — TELEPHONE ENCOUNTER
From: Janes Ibanez  Sent: 5/25/2023 11:25 AM EDT  To: KYLIE Umaña  Subject: Next dose of ama Corona,    I am on the last injection for the 0 5 mg      Thank you,  Amadeo Dey

## 2023-06-05 NOTE — TELEPHONE ENCOUNTER
From: Jose M Ricardo  Sent: 6/5/2023 9:42 AM EDT  To: KYLIE Babcock  Subject: Next dose of wegovy     Hi Chloe,    I have given it another week and still none of my local pharmacies have the [facility] in stock  I am willing to try the alternative medication please let me know your thoughts      Thank you,   Saira Urbina

## 2023-06-06 ENCOUNTER — TELEPHONE (OUTPATIENT)
Dept: BARIATRICS | Facility: CLINIC | Age: 33
End: 2023-06-06

## 2023-09-13 ENCOUNTER — APPOINTMENT (OUTPATIENT)
Dept: LAB | Facility: HOSPITAL | Age: 33
End: 2023-09-13
Payer: COMMERCIAL

## 2023-09-13 DIAGNOSIS — Z01.818 PRE-OP TESTING: ICD-10-CM

## 2023-09-13 LAB
ANION GAP SERPL CALCULATED.3IONS-SCNC: 6 MMOL/L
ATRIAL RATE: 81 BPM
BASOPHILS # BLD AUTO: 0.04 THOUSANDS/ÂΜL (ref 0–0.1)
BASOPHILS NFR BLD AUTO: 1 % (ref 0–1)
BUN SERPL-MCNC: 11 MG/DL (ref 5–25)
CALCIUM SERPL-MCNC: 9.2 MG/DL (ref 8.4–10.2)
CHLORIDE SERPL-SCNC: 103 MMOL/L (ref 96–108)
CO2 SERPL-SCNC: 26 MMOL/L (ref 21–32)
CREAT SERPL-MCNC: 0.84 MG/DL (ref 0.6–1.3)
EOSINOPHIL # BLD AUTO: 0.15 THOUSAND/ÂΜL (ref 0–0.61)
EOSINOPHIL NFR BLD AUTO: 2 % (ref 0–6)
ERYTHROCYTE [DISTWIDTH] IN BLOOD BY AUTOMATED COUNT: 13.1 % (ref 11.6–15.1)
GFR SERPL CREATININE-BSD FRML MDRD: 90 ML/MIN/1.73SQ M
GLUCOSE SERPL-MCNC: 77 MG/DL (ref 65–140)
HCT VFR BLD AUTO: 42.2 % (ref 34.8–46.1)
HGB BLD-MCNC: 13.8 G/DL (ref 11.5–15.4)
IMM GRANULOCYTES # BLD AUTO: 0.03 THOUSAND/UL (ref 0–0.2)
IMM GRANULOCYTES NFR BLD AUTO: 0 % (ref 0–2)
LYMPHOCYTES # BLD AUTO: 2.44 THOUSANDS/ÂΜL (ref 0.6–4.47)
LYMPHOCYTES NFR BLD AUTO: 29 % (ref 14–44)
MCH RBC QN AUTO: 29.2 PG (ref 26.8–34.3)
MCHC RBC AUTO-ENTMCNC: 32.7 G/DL (ref 31.4–37.4)
MCV RBC AUTO: 89 FL (ref 82–98)
MONOCYTES # BLD AUTO: 0.43 THOUSAND/ÂΜL (ref 0.17–1.22)
MONOCYTES NFR BLD AUTO: 5 % (ref 4–12)
NEUTROPHILS # BLD AUTO: 5.41 THOUSANDS/ÂΜL (ref 1.85–7.62)
NEUTS SEG NFR BLD AUTO: 63 % (ref 43–75)
NRBC BLD AUTO-RTO: 0 /100 WBCS
P AXIS: 63 DEGREES
PLATELET # BLD AUTO: 248 THOUSANDS/UL (ref 149–390)
PMV BLD AUTO: 10.4 FL (ref 8.9–12.7)
POTASSIUM SERPL-SCNC: 4 MMOL/L (ref 3.5–5.3)
PR INTERVAL: 140 MS
QRS AXIS: 54 DEGREES
QRSD INTERVAL: 80 MS
QT INTERVAL: 354 MS
QTC INTERVAL: 411 MS
RBC # BLD AUTO: 4.72 MILLION/UL (ref 3.81–5.12)
SODIUM SERPL-SCNC: 135 MMOL/L (ref 135–147)
T WAVE AXIS: 48 DEGREES
VENTRICULAR RATE: 81 BPM
WBC # BLD AUTO: 8.5 THOUSAND/UL (ref 4.31–10.16)

## 2023-09-13 PROCEDURE — 80048 BASIC METABOLIC PNL TOTAL CA: CPT

## 2023-09-13 PROCEDURE — 36415 COLL VENOUS BLD VENIPUNCTURE: CPT

## 2023-09-13 PROCEDURE — 93010 ELECTROCARDIOGRAM REPORT: CPT | Performed by: INTERNAL MEDICINE

## 2023-09-13 PROCEDURE — 85025 COMPLETE CBC W/AUTO DIFF WBC: CPT

## 2023-09-15 NOTE — PRE-PROCEDURE INSTRUCTIONS
Pre-Surgery Instructions:   Medication Instructions   • clindamycin (CLEOCIN T) 1 % lotion Hold day of surgery. • hydrocortisone 2.5 % cream Hold day of surgery. • norethindrone-ethinyl estradiol (Loestrin Fe 1/20) 1-20 MG-MCG per tablet Take day of surgery. • spironolactone (ALDACTONE) 50 mg tablet Hold day of surgery. Medication instructions for day surgery reviewed. Please use only a sip of water to take your instructed medications. Avoid all over the counter vitamins, supplements and NSAIDS for one week prior to surgery per anesthesia guidelines. Tylenol is ok to take as needed. You will receive a call one business day prior to surgery with an arrival time and hospital directions. If your surgery is scheduled on a Monday, the hospital will be calling you on the Friday prior to your surgery. If you have not heard from anyone by 8pm, please call the hospital supervisor through the hospital  at [phone]. Nino Cardona [phone]). Do not eat or drink anything after midnight the night before your surgery, including candy, mints, lifesavers, or chewing gum. Do not drink alcohol 24hrs before your surgery. Try not to smoke at least 24hrs before your surgery. Follow the pre surgery showering instructions as listed in the [facility] Surgical Experience Booklet” or otherwise provided by your surgeon's office. Do not shave the surgical area 24 hours before surgery. Do not apply any lotions, creams, including makeup, cologne, deodorant, or perfumes after showering on the day of your surgery. No contact lenses, eye make-up, or artificial eyelashes. Remove nail polish, including gel polish, and any artificial, gel, or acrylic nails if possible. Remove all jewelry including rings and body piercing jewelry. Wear causal clothing that is easy to take on and off. Consider your type of surgery. Keep any valuables, jewelry, piercings at home.  Please bring any specially ordered equipment (sling, braces) if indicated. Arrange for a responsible person to drive you to and from the hospital on the day of your surgery. Visitor Guidelines discussed. Call the surgeon's office with any new illnesses, exposures, or additional questions prior to surgery. Please reference your [facility] Surgical Experience Booklet” for additional information to prepare for your upcoming surgery.

## 2023-09-22 ENCOUNTER — ANESTHESIA EVENT (OUTPATIENT)
Dept: PERIOP | Facility: HOSPITAL | Age: 33
End: 2023-09-22
Payer: COMMERCIAL

## 2023-09-23 NOTE — ANESTHESIA PREPROCEDURE EVALUATION
Procedure:  TONSILLECTOMY (Throat)    Relevant Problems   MUSCULOSKELETAL   (+) Chronic bilateral low back pain without sciatica      NEURO/PSYCH   (+) Chronic bilateral low back pain without sciatica   (+) Depression   (+) Situational anxiety      PULMONARY   (+) Asthma      Lab Results   Component Value Date    WBC 8.50 09/13/2023    HGB 13.8 09/13/2023    HCT 42.2 09/13/2023    MCV 89 09/13/2023     09/13/2023         Physical Exam    Airway    Mallampati score: I  TM Distance: >3 FB  Neck ROM: full     Dental   No notable dental hx     Cardiovascular  Rhythm: regular, Rate: normal,     Pulmonary  Breath sounds clear to auscultation,     Other Findings  Intercisor Distance > 3cm          Anesthesia Plan  ASA Score- 2     Anesthesia Type- general with ASA Monitors. Additional Monitors:   Airway Plan: ETT. Comment: Discussed benefits/risks of general anesthesia including possibility of mouth/throat pain, injury to lips/teeth, nausea/vomiting, and surgical pain along with more rare complications such as stroke, MI, pneumonia, aspiration, and injury to blood vessels. All questions answered. .       Plan Factors-Exercise tolerance (METS): >4 METS. Chart reviewed. EKG reviewed. Existing labs reviewed. Induction- intravenous. Postoperative Plan- Plan for postoperative opioid use. Planned trial extubation    Informed Consent- Anesthetic plan and risks discussed with patient. I personally reviewed this patient with the CRNA. Discussed and agreed on the Anesthesia Plan with the CRNA. Mike Katz

## 2023-09-25 ENCOUNTER — HOSPITAL ENCOUNTER (OUTPATIENT)
Facility: HOSPITAL | Age: 33
Setting detail: OUTPATIENT SURGERY
Discharge: HOME | End: 2023-09-25
Attending: OTOLARYNGOLOGY | Admitting: OTOLARYNGOLOGY
Payer: COMMERCIAL

## 2023-09-25 ENCOUNTER — ANESTHESIA (OUTPATIENT)
Dept: PERIOP | Facility: HOSPITAL | Age: 33
End: 2023-09-25
Payer: COMMERCIAL

## 2023-09-25 VITALS
HEART RATE: 71 BPM | DIASTOLIC BLOOD PRESSURE: 85 MMHG | HEIGHT: 62 IN | WEIGHT: 160 LBS | BODY MASS INDEX: 29.44 KG/M2 | TEMPERATURE: 98 F | RESPIRATION RATE: 12 BRPM | SYSTOLIC BLOOD PRESSURE: 124 MMHG | OXYGEN SATURATION: 100 %

## 2023-09-25 DIAGNOSIS — G89.18 POST-TONSILLECTOMY PAIN: Primary | ICD-10-CM

## 2023-09-25 DIAGNOSIS — Z90.89 POST-TONSILLECTOMY PAIN: Primary | ICD-10-CM

## 2023-09-25 LAB
EXT PREGNANCY TEST URINE: NEGATIVE
EXT. CONTROL: NORMAL

## 2023-09-25 PROCEDURE — 42826 REMOVAL OF TONSILS: CPT | Performed by: OTOLARYNGOLOGY

## 2023-09-25 PROCEDURE — 81025 URINE PREGNANCY TEST: CPT | Performed by: OTOLARYNGOLOGY

## 2023-09-25 RX ORDER — DEXAMETHASONE SODIUM PHOSPHATE 10 MG/ML
INJECTION, SOLUTION INTRAMUSCULAR; INTRAVENOUS AS NEEDED
Status: DISCONTINUED | OUTPATIENT
Start: 2023-09-25 | End: 2023-09-25

## 2023-09-25 RX ORDER — SCOLOPAMINE TRANSDERMAL SYSTEM 1 MG/1
1 PATCH, EXTENDED RELEASE TRANSDERMAL
Status: DISCONTINUED | OUTPATIENT
Start: 2023-09-25 | End: 2023-09-25 | Stop reason: HOSPADM

## 2023-09-25 RX ORDER — FENTANYL CITRATE 50 UG/ML
INJECTION, SOLUTION INTRAMUSCULAR; INTRAVENOUS AS NEEDED
Status: DISCONTINUED | OUTPATIENT
Start: 2023-09-25 | End: 2023-09-25

## 2023-09-25 RX ORDER — ONDANSETRON 2 MG/ML
INJECTION INTRAMUSCULAR; INTRAVENOUS AS NEEDED
Status: DISCONTINUED | OUTPATIENT
Start: 2023-09-25 | End: 2023-09-25

## 2023-09-25 RX ORDER — LIDOCAINE HYDROCHLORIDE 20 MG/ML
INJECTION, SOLUTION EPIDURAL; INFILTRATION; INTRACAUDAL; PERINEURAL
Status: DISCONTINUED
Start: 2023-09-25 | End: 2023-09-25 | Stop reason: HOSPADM

## 2023-09-25 RX ORDER — SODIUM CHLORIDE, SODIUM LACTATE, POTASSIUM CHLORIDE, CALCIUM CHLORIDE 600; 310; 30; 20 MG/100ML; MG/100ML; MG/100ML; MG/100ML
INJECTION, SOLUTION INTRAVENOUS CONTINUOUS PRN
Status: DISCONTINUED | OUTPATIENT
Start: 2023-09-25 | End: 2023-09-25

## 2023-09-25 RX ORDER — GLYCOPYRROLATE 0.2 MG/ML
INJECTION INTRAMUSCULAR; INTRAVENOUS AS NEEDED
Status: DISCONTINUED | OUTPATIENT
Start: 2023-09-25 | End: 2023-09-25

## 2023-09-25 RX ORDER — MIDAZOLAM HYDROCHLORIDE 2 MG/2ML
INJECTION, SOLUTION INTRAMUSCULAR; INTRAVENOUS AS NEEDED
Status: DISCONTINUED | OUTPATIENT
Start: 2023-09-25 | End: 2023-09-25

## 2023-09-25 RX ORDER — PROPOFOL 10 MG/ML
INJECTION, EMULSION INTRAVENOUS AS NEEDED
Status: DISCONTINUED | OUTPATIENT
Start: 2023-09-25 | End: 2023-09-25

## 2023-09-25 RX ORDER — HYDROMORPHONE HCL/PF 1 MG/ML
SYRINGE (ML) INJECTION AS NEEDED
Status: DISCONTINUED | OUTPATIENT
Start: 2023-09-25 | End: 2023-09-25

## 2023-09-25 RX ORDER — SODIUM CHLORIDE, SODIUM LACTATE, POTASSIUM CHLORIDE, CALCIUM CHLORIDE 600; 310; 30; 20 MG/100ML; MG/100ML; MG/100ML; MG/100ML
125 INJECTION, SOLUTION INTRAVENOUS CONTINUOUS
Status: DISCONTINUED | OUTPATIENT
Start: 2023-09-25 | End: 2023-09-25 | Stop reason: HOSPADM

## 2023-09-25 RX ORDER — ONDANSETRON 2 MG/ML
4 INJECTION INTRAMUSCULAR; INTRAVENOUS ONCE AS NEEDED
Status: DISCONTINUED | OUTPATIENT
Start: 2023-09-25 | End: 2023-09-25 | Stop reason: HOSPADM

## 2023-09-25 RX ORDER — FENTANYL CITRATE/PF 50 MCG/ML
25 SYRINGE (ML) INJECTION
Status: DISCONTINUED | OUTPATIENT
Start: 2023-09-25 | End: 2023-09-25 | Stop reason: HOSPADM

## 2023-09-25 RX ORDER — LIDOCAINE HYDROCHLORIDE 10 MG/ML
0.5 INJECTION, SOLUTION EPIDURAL; INFILTRATION; INTRACAUDAL; PERINEURAL ONCE AS NEEDED
Status: COMPLETED | OUTPATIENT
Start: 2023-09-25 | End: 2023-09-25

## 2023-09-25 RX ORDER — ACETAMINOPHEN 325 MG/1
975 TABLET ORAL ONCE
Status: COMPLETED | OUTPATIENT
Start: 2023-09-25 | End: 2023-09-25

## 2023-09-25 RX ORDER — NEOSTIGMINE METHYLSULFATE 1 MG/ML
INJECTION INTRAVENOUS AS NEEDED
Status: DISCONTINUED | OUTPATIENT
Start: 2023-09-25 | End: 2023-09-25

## 2023-09-25 RX ORDER — ROCURONIUM BROMIDE 10 MG/ML
INJECTION, SOLUTION INTRAVENOUS AS NEEDED
Status: DISCONTINUED | OUTPATIENT
Start: 2023-09-25 | End: 2023-09-25

## 2023-09-25 RX ORDER — LIDOCAINE HYDROCHLORIDE 10 MG/ML
INJECTION, SOLUTION EPIDURAL; INFILTRATION; INTRACAUDAL; PERINEURAL AS NEEDED
Status: DISCONTINUED | OUTPATIENT
Start: 2023-09-25 | End: 2023-09-25

## 2023-09-25 RX ADMIN — FENTANYL CITRATE 25 MCG: 50 INJECTION INTRAMUSCULAR; INTRAVENOUS at 11:24

## 2023-09-25 RX ADMIN — ROCURONIUM BROMIDE 50 MG: 10 INJECTION, SOLUTION INTRAVENOUS at 10:03

## 2023-09-25 RX ADMIN — ACETAMINOPHEN 975 MG: 325 TABLET, FILM COATED ORAL at 08:59

## 2023-09-25 RX ADMIN — SODIUM CHLORIDE, SODIUM LACTATE, POTASSIUM CHLORIDE, AND CALCIUM CHLORIDE: .6; .31; .03; .02 INJECTION, SOLUTION INTRAVENOUS at 09:58

## 2023-09-25 RX ADMIN — LIDOCAINE HYDROCHLORIDE 50 MG: 10 INJECTION, SOLUTION EPIDURAL; INFILTRATION; INTRACAUDAL; PERINEURAL at 10:03

## 2023-09-25 RX ADMIN — MIDAZOLAM 2 MG: 1 INJECTION INTRAMUSCULAR; INTRAVENOUS at 09:58

## 2023-09-25 RX ADMIN — SODIUM CHLORIDE, SODIUM LACTATE, POTASSIUM CHLORIDE, AND CALCIUM CHLORIDE 125 ML/HR: .6; .31; .03; .02 INJECTION, SOLUTION INTRAVENOUS at 08:57

## 2023-09-25 RX ADMIN — GLYCOPYRROLATE 0.4 MG: 0.2 INJECTION, SOLUTION INTRAMUSCULAR; INTRAVENOUS at 10:40

## 2023-09-25 RX ADMIN — HYDROMORPHONE HYDROCHLORIDE 1 MG: 1 INJECTION, SOLUTION INTRAMUSCULAR; INTRAVENOUS; SUBCUTANEOUS at 10:05

## 2023-09-25 RX ADMIN — FENTANYL CITRATE 25 MCG: 50 INJECTION INTRAMUSCULAR; INTRAVENOUS at 11:29

## 2023-09-25 RX ADMIN — DEXAMETHASONE SODIUM PHOSPHATE 10 MG: 10 INJECTION, SOLUTION INTRAMUSCULAR; INTRAVENOUS at 10:05

## 2023-09-25 RX ADMIN — PROPOFOL 200 MG: 10 INJECTION, EMULSION INTRAVENOUS at 10:03

## 2023-09-25 RX ADMIN — SCOPALAMINE 1 PATCH: 1 PATCH, EXTENDED RELEASE TRANSDERMAL at 09:01

## 2023-09-25 RX ADMIN — NEOSTIGMINE METHYLSULFATE 4 MG: 1 INJECTION INTRAVENOUS at 10:40

## 2023-09-25 RX ADMIN — ONDANSETRON 4 MG: 2 INJECTION INTRAMUSCULAR; INTRAVENOUS at 10:18

## 2023-09-25 RX ADMIN — LIDOCAINE HYDROCHLORIDE 0.5 ML: 10 INJECTION, SOLUTION EPIDURAL; INFILTRATION; INTRACAUDAL; PERINEURAL at 08:57

## 2023-09-25 RX ADMIN — FENTANYL CITRATE 100 MCG: 50 INJECTION, SOLUTION INTRAMUSCULAR; INTRAVENOUS at 10:03

## 2023-09-25 NOTE — H&P
[facility]  OTOLARYNGOLOGY - HEAD & NECK SURGERY    Ariella Alberts  [id number]  1990    HISTORY & PHYSICAL      History of Present Illness: 78-year-old woman who presents for follow-up. She is here for tonsillectomy. No major changes since last visit. She still has issues predominantly with tonsil stones. HISTORICALLY: 78-year-old woman who presents for further evaluation of her tonsils. The patient states that few weeks ago, she started to have some dryness in her throat, soreness, and postnasal drip. She states that the pain started to go into her ear. She started to have a nasty taste in her mouth. She reports her tonsils with a Q-tip, and a lot of tonsil stones started to come out. Since that time, she has been gargling with a lot of salt water and just her eating. This is really distressing to her. In addition to this, the patient has had strep throat 2-3 times a year since she was young. She states that she usually needs antibiotics for this. She will get white exudate, swollen lymph nodes, difficulty swallowing, etc.  She usually does test positive for strep at these times. No Known Allergies    Past Medical History:   Diagnosis Date   • Abnormal Pap smear of cervix    • Anemia    • Anxiety    • Asthma    • COVID-19 virus detected 2020    Formatting of this note might be different from the original. 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 Tested positive on 2020. Staff must wear proper PPE at pt vero'ts - mask, gown, gloves.    Last Assessment & Plan:  Formatting of this note might be different from the original. Tested positive 20 Denies symptoms Sche[place]   • Depression    • Gestational diabetes    • IBS (irritable bowel syndrome)    • PCOS (polycystic ovarian syndrome)        Past Surgical History:   Procedure Laterality Date   •  SECTION  2020   • COLONOSCOPY 2015   • WISDOM TOOTH EXTRACTION         Family History   Problem Relation Age of Onset   • No Known Problems Mother    • No Known Problems Father    • No Known Problems Brother    • Diabetes Maternal Grandmother    • No Known Problems Brother    • Dementia Maternal Grandfather    • Diabetes Paternal Grandmother    • No Known Problems Paternal Grandfather    • No Known Problems Son    • No Known Problems Son    • Colon cancer Neg Hx    • Breast cancer Neg Hx    • Ovarian cancer Neg Hx         Social History     Tobacco Use   • Smoking status: Never   • Smokeless tobacco: Never   Vaping Use   • Vaping Use: Never used   Substance Use Topics   • Alcohol use: Yes     Comment: Rare Socially   • Drug use: Never       No current facility-administered medications on file prior to encounter. Current Outpatient Medications on File Prior to Encounter   Medication Sig Dispense Refill   • clindamycin (CLEOCIN T) 1 % lotion Apply 1 Application topically daily     • hydrocortisone 2.5 % cream Apply 1 Application topically if needed for irritation     • norethindrone-ethinyl estradiol (Loestrin Fe 1/20) 1-20 MG-MCG per tablet Take 1 tablet by mouth daily (Patient taking differently: Take 1 tablet by mouth every morning) 84 tablet 3   • spironolactone (ALDACTONE) 50 mg tablet Take 50 mg by mouth every morning     • HYDROcodone-acetaminophen (HYCET) 7.5-325 MG/15ML solution Take 15 mL by mouth every 6 (six) hours as needed for moderate pain Max Daily Amount: 60 mL 473 mL 0   • Insulin Pen Needle 32G X 4 MM MISC Use in the morning (Patient not taking: Reported on 8/18/2023) 100 each 1   • liraglutide (SAXENDA) injection Inject 0.6 mg subcutaneously once per day for the first week. Increase in weekly intervals by 0.6 mg until a dose of 3 mg is reached (Patient not taking: Reported on 9/15/2023) 15 mL 2   • tretinoin (RETIN-A) 0.025 % cream Apply 1 Application topically daily         Review of Systems:  10-point ROS performed. Patient denies fevers or chills. All other systems reviewed and found to be negative unless otherwise noted in the HPI or MA note. Vitals:    09/15/23 0905 09/25/23 0847   BP:  116/77   Pulse:  78   Resp:  18   Temp:  97.7 °F (36.5 °C)   TempSrc:  Temporal   SpO2:  100%   Weight: 74.4 kg (164 lb) 72.6 kg (160 lb)   Height: 5' 2" (1.575 m) 5' 2" (1.575 m)         General Appearance: No apparent distress    Head: Normocephalic, atraumatic. Face: Symmetric without obvious lesions. Eyes: Conjunctiva clear, extraocular movements are intact. Ears: Pinna normal shape and position. Canals are clear. TMs intact with no middle ear effusion. Nose: External pyramid midline. Mucosa appears healthy. Turbinates are normal in size. Oral cavity/Oropharynx: No mucosal lesions, masses, or pharyngeal asymmetry. Cryptic tonsils 2+ bilaterally. Neck: No cervical lymphadenopathy or masses appreciated    Skin: Skin warm and dry. Neurological: Cranial nerves II to XII are intact. Respiratory: No stridor or labored breathing. CTAB. Cardiovascular: Good perfusion of the upper extremities. No cyanosis of the fingers or hands. RRR. Psychiatric: Alert and oriented. Assessment:  1. Chronic tonsillitis  2. Tonsil stones      Plan: To OR for tonsillectomy. Brianne Estevez MD  Otolaryngology - Head & Neck Surgery  [facility]            ** Please Note: Dictation voice to text software may have been used in the creation of this document.  **

## 2023-09-25 NOTE — DISCHARGE INSTR - AVS FIRST PAGE
Tonsillectomy and Adenoidectomy Postoperative Instructions    What to expect:  -Pink or blood streaked saliva during the first 24 hours  -Patient may refuse to eat or drink anything by mouth. Limited food intake is acceptable in the first 2-3 days as long as he or she is drinking plenty of fluids (urine remains light yellow or clear). Offer sips of liquid (water, juice, Gatorade, Pedialyte) every hour.  -Bad breath  -White/Yellow/Gray coating in the back of the throat  -Pain with swallowing/talking  -Ear pain    What to Avoid x 2 weeks:  -Do Not eat foods with sharp edges or crunchy coatings for 2 weeks following surgery; Stick with soft/mushy foods (pasta, mashed potatoes, baked chicken, cooked vegetables, pudding, etc.)  -Do Not drink fluids with red dye since it can look like blood  -Do Not eat or drink anything that is hot or acidic (orange juice, soda, etc.)  -Do Not gargle  -Do Not strain or lift anything heavy  -Do Not take aspirin or blood thinners until instructed to do so by your doctor    When to call the doctor or go to Emergency Room:  -Bright red blood coming from the mouth or nose  -Coughing up dark blood or blood clots  -Shortness of breath  -Persistent nausea/vomiting  -Temperature above 101 F  -Feeling faint or dizzy  -Decreased urine output compared to before surgery     Follow up with your doctor in 2-3 weeks, or as instructed. -[facility]:  [address] [facility] ENT:  [facility] ENT:  [address] [facility]:  [phone]     Medication      Hydrocodone/Acetaminophen liquid   15 mL (of 7.5mg/325mg/15mL) by mouth every 6 hours as needed  (0.1mg/kg/dose hydrocodone)      NOTE: Do not exceed more than 2 grams of Acetaminophen in 24 hours if under 15years old, or 3-4 grams of Acetaminophen in 24 hours if over 15years old. Do not take more than 1 medication containing acetaminophen (Tylenol) at the same time.

## 2023-09-25 NOTE — OP NOTE
OPERATIVE REPORT  PATIENT NAME: Ascencion Fields    :  1990  MRN: [id number]  Pt Location:  OR ROOM 06    SURGERY DATE: 2023    Surgeon(s) and Role:     * Orquidea Posey MD - Primary     * Madhavi Pelletier MD - Assisting    Preop Diagnosis:  Chronic tonsillitis    Post-Op Diagnosis Codes:     * Tonsil stone [J35.8]    Procedure(s):  TONSILLECTOMY BILATERAL    Specimen(s):  * No specimens in log *    Estimated Blood Loss:   Minimal    Drains:  * No LDAs found *    Anesthesia Type:   General    Operative Indications:  Chronic Tonsillitis    Operative Findings:  Tonsils 1+, cryptic, endophytic, tonsilliths    Complications:   None    Procedure and Technique:    The patient was positively identified and transferred onto the operating table in the supine position. Appropriate monitoring devices were put in place, anesthesia was induced and the patient was intubated without difficulty. The operating room table was then turned 90 degrees, and a shoulder roll was placed. Before proceeding further, the time out procedure was completed. A McIvor oral gag was introduced opened and suspended from the edge of the Lincoln stand. Palpation of the hard palate revealed no submucosal cleft. The right tonsil was grasped, retracted medially and dissected free of the surrounding tissue using Bovie cautery at a setting of 20. In a similar fashion, the left tonsil was removed, and hemostasis was accomplished in bilateral tonsillar fossae using suction Bovie cautery. The McIvor oral gag was let down for a minute and reopened. Hemostasis was again accomplished using suction Bovie cautery. The red rubber catheter and the McIvor oral gag were then removed. Anesthesia was reversed. The patient was awakened, extubated and taken to the recovery room in stable condition. All counts were correct at the end of the case, and no complications were encountered.     Patient Disposition:  PACU         SIGNATURE: Osvaldo Adamson Silvana Souza MD  DATE: September 25, 2023  TIME: 8:54 AM

## 2023-09-25 NOTE — ANESTHESIA POSTPROCEDURE EVALUATION
Post-Op Assessment Note    CV Status:  Stable  Pain Score: 0    Pain management: adequate     Mental Status:  Sleepy   Hydration Status:  Euvolemic   PONV Controlled:  None   Airway Patency:  Patent      Post Op Vitals Reviewed: Yes      Staff: Anesthesiologist, CRNA   Comments: report given to RN; VSS; 6l/mion facermask for transport        No notable events documented.     BP   144/97   Temp   97.8   Pulse  81   Resp   18   SpO2   97

## 2023-09-27 DIAGNOSIS — Z90.89 POST-TONSILLECTOMY PAIN: ICD-10-CM

## 2023-09-27 DIAGNOSIS — G89.18 POST-TONSILLECTOMY PAIN: ICD-10-CM

## 2023-11-16 ENCOUNTER — OFFICE VISIT (OUTPATIENT)
Dept: INTERNAL MEDICINE CLINIC | Facility: OTHER | Age: 33
End: 2023-11-16
Payer: COMMERCIAL

## 2023-11-16 VITALS
TEMPERATURE: 98.3 F | HEART RATE: 80 BPM | DIASTOLIC BLOOD PRESSURE: 74 MMHG | BODY MASS INDEX: 30 KG/M2 | WEIGHT: 163 LBS | HEIGHT: 62 IN | SYSTOLIC BLOOD PRESSURE: 126 MMHG | RESPIRATION RATE: 18 BRPM | OXYGEN SATURATION: 98 %

## 2023-11-16 DIAGNOSIS — M54.9 UPPER BACK PAIN ON LEFT SIDE: ICD-10-CM

## 2023-11-16 DIAGNOSIS — Z00.00 ANNUAL PHYSICAL EXAM: Primary | ICD-10-CM

## 2023-11-16 DIAGNOSIS — Z23 ENCOUNTER FOR IMMUNIZATION: ICD-10-CM

## 2023-11-16 PROCEDURE — 90471 IMMUNIZATION ADMIN: CPT

## 2023-11-16 PROCEDURE — 99395 PREV VISIT EST AGE 18-39: CPT | Performed by: NURSE PRACTITIONER

## 2023-11-16 PROCEDURE — 90686 IIV4 VACC NO PRSV 0.5 ML IM: CPT

## 2023-11-16 RX ORDER — DOXYCYCLINE HYCLATE 50 MG/1
50 CAPSULE ORAL DAILY
COMMUNITY

## 2023-11-16 RX ORDER — METHOCARBAMOL 500 MG/1
500 TABLET, FILM COATED ORAL
Qty: 30 TABLET | Refills: 0 | Status: SHIPPED | OUTPATIENT
Start: 2023-11-16

## 2023-11-16 NOTE — PATIENT INSTRUCTIONS
Wellness Visit for Adults   AMBULATORY CARE:   A wellness visit  is when you see your healthcare provider to get screened for health problems. Your healthcare provider will also give you advice on how to stay healthy. Write down your questions so you remember to ask them. Ask your healthcare provider how often you should have a wellness visit. What happens at a wellness visit:  Your healthcare provider will ask about your health, and your family history of health problems. This includes high blood pressure, heart disease, and cancer. He or she will ask if you have symptoms that concern you, if you smoke, and about your mood. You may also be asked about your intake of medicines, supplements, food, and alcohol. Any of the following may be done: Your weight  will be checked. Your height may also be checked so your body mass index (BMI) can be calculated. Your BMI shows if you are at a healthy weight. Your blood pressure  and heart rate will be checked. Your temperature may also be checked. Blood and urine tests  may be done. Blood tests may be done to check your cholesterol levels. Abnormal cholesterol levels increase your risk for heart disease and stroke. You may also need a blood or urine test to check for diabetes if you are at increased risk. Urine tests may be done to look for signs of an infection or kidney disease. A physical exam  includes checking your heartbeat and lungs with a stethoscope. Your healthcare provider may also check your skin to look for sun damage. Screening tests  may be recommended. A screening test is done to check for diseases that may not cause symptoms. The screening tests you may need depend on your age, gender, family history, and lifestyle habits. For example, colorectal screening may be recommended if you are 48years old or older. Screening tests you need if you are a woman:   A Pap smear  is used to screen for cervical cancer.  Pap smears are usually done every 3 to 5 years depending on your age. You may need them more often if you have had abnormal Pap smear test results in the past. Ask your healthcare provider how often you should have a Pap smear. A mammogram  is an x-ray of your breasts to screen for breast cancer. Experts recommend mammograms every 2 years starting at age 48 years. You may need a mammogram at age 52 years or younger if you have an increased risk for breast cancer. Talk to your healthcare provider about when you should start having mammograms and how often you need them. Vaccines you may need:   Get an influenza vaccine  every year. The influenza vaccine protects you from the flu. Several types of viruses cause the flu. The viruses change over time, so new vaccines are made each year. Get a tetanus-diphtheria (Td) booster vaccine  every 10 years. This vaccine protects you against tetanus and diphtheria. Tetanus is a severe infection that may cause painful muscle spasms and lockjaw. Diphtheria is a severe bacterial infection that causes a thick covering in the back of your mouth and throat. Get a human papillomavirus (HPV) vaccine  if you are female and aged 23 to 32 or male 23 to 24 and never received it. This vaccine protects you from HPV infection. HPV is the most common infection spread by sexual contact. HPV may also cause vaginal, penile, and anal cancers. Get a pneumococcal vaccine  if you are aged 72 years or older. The pneumococcal vaccine is an injection given to protect you from pneumococcal disease. Pneumococcal disease is an infection caused by pneumococcal bacteria. The infection may cause pneumonia, meningitis, or an ear infection. Get a shingles vaccine  if you are 60 or older, even if you have had shingles before. The shingles vaccine is an injection to protect you from the varicella-zoster virus. This is the same virus that causes chickenpox.  Shingles is a painful rash that develops in people who had chickenpox or have been exposed to the virus. How to eat healthy:  My Plate is a model for planning healthy meals. It shows the types and amounts of foods that should go on your plate. Fruits and vegetables make up about half of your plate, and grains and protein make up the other half. A serving of dairy is included on the side of your plate. The amount of calories and serving sizes you need depends on your age, gender, weight, and height. Examples of healthy foods are listed below:  Eat a variety of vegetables  such as dark green, red, and orange vegetables. You can also include canned vegetables low in sodium (salt) and frozen vegetables without added butter or sauces. Eat a variety of fresh fruits , canned fruit in 100% juice, frozen fruit, and dried fruit. Include whole grains. At least half of the grains you eat should be whole grains. Examples include whole-wheat bread, wheat pasta, brown rice, and whole-grain cereals such as oatmeal.    Eat a variety of protein foods such as seafood (fish and shellfish), lean meat, and poultry without skin (turkey and chicken). Examples of lean meats include pork leg, shoulder, or tenderloin, and beef round, sirloin, tenderloin, and extra lean ground beef. Other protein foods include eggs and egg substitutes, beans, peas, soy products, nuts, and seeds. Choose low-fat dairy products such as skim or 1% milk or low-fat yogurt, cheese, and cottage cheese. Limit unhealthy fats  such as butter, hard margarine, and shortening. Exercise:  Exercise at least 30 minutes per day on most days of the week. Some examples of exercise include walking, biking, dancing, and swimming. You can also fit in more physical activity by taking the stairs instead of the elevator or parking farther away from stores. Include muscle strengthening activities 2 days each week. Regular exercise provides many health benefits.  It helps you manage your weight, and decreases your risk for type 2 diabetes, heart disease, stroke, and high blood pressure. Exercise can also help improve your mood. Ask your healthcare provider about the best exercise plan for you. General health and safety guidelines:   Do not smoke. Nicotine and other chemicals in cigarettes and cigars can cause lung damage. Ask your healthcare provider for information if you currently smoke and need help to quit. E-cigarettes or smokeless tobacco still contain nicotine. Talk to your healthcare provider before you use these products. Limit alcohol. A drink of alcohol is 12 ounces of beer, 5 ounces of wine, or 1½ ounces of liquor. Lose weight, if needed. Being overweight increases your risk of certain health conditions. These include heart disease, high blood pressure, type 2 diabetes, and certain types of cancer. Protect your skin. Do not sunbathe or use tanning beds. Use sunscreen with a SPF 15 or higher. Apply sunscreen at least 15 minutes before you go outside. Reapply sunscreen every 2 hours. Wear protective clothing, hats, and sunglasses when you are outside. Drive safely. Always wear your seatbelt. Make sure everyone in your car wears a seatbelt. A seatbelt can save your life if you are in an accident. Do not use your cell phone when you are driving. This could distract you and cause an accident. Pull over if you need to make a call or send a text message. Practice safe sex. Use latex condoms if are sexually active and have more than one partner. Your healthcare provider may recommend screening tests for sexually transmitted infections (STIs). Wear helmets, lifejackets, and protective gear. Always wear a helmet when you ride a bike or motorcycle, go skiing, or play sports that could cause a head injury. Wear protective equipment when you play sports. Wear a lifejacket when you are on a boat or doing water sports.     © Copyright Lisa Ranks 2023 Information is for End User's use only and may not be sold, redistributed or otherwise used for commercial purposes. The above information is an  only. It is not intended as medical advice for individual conditions or treatments. Talk to your doctor, nurse or pharmacist before following any medical regimen to see if it is safe and effective for you.

## 2023-11-16 NOTE — ASSESSMENT & PLAN NOTE
- chronic pain and tenderness of left upper back  - recommend frequent stretching, moist heat  - start robaxin qhs prn, advised to not drive on this medication  - start physical therapy  - if no improvement, can consider injection

## 2023-11-16 NOTE — PROGRESS NOTES
ADULT ANNUAL  [facility] [place]    NAME: Dee Arango  AGE: 28 y.o. SEX: female  : 1990     DATE: 2023     Assessment and Plan:     Problem List Items Addressed This Visit       Annual physical exam - Primary     - Healthy 28 yr old female  - continue healthy diet and routine exercise, previously following with bariatrics and having success on Wegovy but discontinued due to shortage. Plans to restart when available   - thyroid US and TSH up to date. Plan to repeat every 2 years or sooner as needed  - labs up to date          Upper back pain on left side     - chronic pain and tenderness of left upper back  - recommend frequent stretching, moist heat  - start robaxin qhs prn, advised to not drive on this medication  - start physical therapy  - if no improvement, can consider injection          Relevant Medications    methocarbamol (ROBAXIN) 500 mg tablet    Other Relevant Orders    Ambulatory Referral to Physical Therapy     Other Visit Diagnoses       Encounter for immunization        Relevant Orders    influenza vaccine, quadrivalent, 0.5 mL, preservative-free, for adult and pediatric patients 6 mos+ (AFLURIA, FLUARIX, FLULAVAL, FLUZONE) (Completed)            Immunizations and preventive care screenings were discussed with patient today. Appropriate education was printed on patient's after visit summary. Return in about 1 year (around 2024). Chief Complaint:     Chief Complaint   Patient presents with    Follow-up     Last office visit 22 - no labs due - no issues    Physical Exam     annual         Bmi f/u    Flu Vaccine     given      History of Present Illness:     Adult Annual Physical   Patient here for a comprehensive physical exam.     Diet and Physical Activity  Diet/Nutrition: well balanced diet. Exercise: 5-7 times a week on average.    She was following with bariatrics and losing weight on Wegovy but due to the national shortage she is not on it anymore. Depression Screening  PHQ-2/9 Depression Screening    Little interest or pleasure in doing things: 0 - not at all  Feeling down, depressed, or hopeless: 0 - not at all  Trouble falling or staying asleep, or sleeping too much: 0 - not at all  Feeling tired or having little energy: 0 - not at all  Poor appetite or overeatin - not at all  Feeling bad about yourself - or that you are a failure or have let yourself or your family down: 0 - not at all  Trouble concentrating on things, such as reading the newspaper or watching television: 0 - not at all  Moving or speaking so slowly that other people could have noticed. Or the opposite - being so fidgety or restless that you have been moving around a lot more than usual: 0 - not at all  Thoughts that you would be better off dead, or of hurting yourself in some way: 0 - not at all  PHQ-9 Score: 0   PHQ-9 Interpretation: No or Minimal depression          General Health  Sleep: sleeps well. Hearing: normal - bilateral.  Vision: no vision problems. Dental: regular dental visits. /GYN Health  Follows with gynecology? yes   Last menstrual period: 10/28/2023  Contraceptive method: barrier methods. Review of Systems:     Review of Systems   Constitutional:  Negative for activity change, appetite change, chills, fever and unexpected weight change. Respiratory:  Negative for cough, chest tightness, shortness of breath and wheezing. Cardiovascular:  Negative for chest pain, palpitations and leg swelling. Gastrointestinal:  Positive for constipation (occasional). Negative for abdominal pain, anal bleeding, diarrhea, nausea, rectal pain and vomiting. Genitourinary:  Negative for difficulty urinating, dysuria, frequency and hematuria. Musculoskeletal:  Positive for myalgias (left upper back, chronic). Neurological:  Negative for dizziness, light-headedness and headaches. Psychiatric/Behavioral:  Negative for dysphoric mood and sleep disturbance. The patient is not nervous/anxious. Past Medical History:     Past Medical History:   Diagnosis Date    Abnormal Pap smear of cervix     Anemia     Anxiety     Asthma     COVID-19 virus detected 2020    Formatting of this note might be different from the original. 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 Tested positive on 2020. Staff must wear proper PPE at pt vero'ts - mask, gown, gloves.    Last Assessment & Plan:  Formatting of this note might be different from the original. Tested positive 20 Denies symptoms [place]    Depression     Gestational diabetes     IBS (irritable bowel syndrome)     PCOS (polycystic ovarian syndrome)       Past Surgical History:     Past Surgical History:   Procedure Laterality Date     SECTION  2020    COLONOSCOPY  2015    OK TONSILLECTOMY PRIMARY/SECONDARY AGE 11/> N/A 2023    Procedure: TONSILLECTOMY;  Surgeon: Sheryle Grit, MD;  Location: [facility];  Service: ENT    WISDOM TOOTH EXTRACTION        Social History:     Social History     Socioeconomic History    Marital status: Single     Spouse name: None    Number of children: None    Years of education: None    Highest education level: None   Occupational History    None   Tobacco Use    Smoking status: Never    Smokeless tobacco: Never   Vaping Use    Vaping Use: Never used   Substance and Sexual Activity    Alcohol use: Yes     Comment: Rare Socially    Drug use: Never    Sexual activity: Yes     Partners: Male     Birth control/protection: OCP   Other Topics Concern    None   Social History Narrative    None     Social Determinants of Health     Financial Resource Strain: Not on file   Food Insecurity: Not on file   Transportation Needs: Not on file   Physical Activity: Not on file   Stress: Not on file   Social Connections: Not on file Intimate Partner Violence: Not on file   Housing Stability: Not on file      Family History:     Family History   Problem Relation Age of Onset    No Known Problems Mother     No Known Problems Father     No Known Problems Brother     Diabetes Maternal Grandmother     No Known Problems Brother     Dementia Maternal Grandfather     Diabetes Paternal Grandmother     No Known Problems Paternal Grandfather     No Known Problems Son     No Known Problems Son     Colon cancer Neg Hx     Breast cancer Neg Hx     Ovarian cancer Neg Hx       Current Medications:     Current Outpatient Medications   Medication Sig Dispense Refill    doxycycline hyclate (VIBRAMYCIN) 50 mg capsule Take 50 mg by mouth in the morning      hydrocortisone 2.5 % cream Apply 1 Application topically if needed for irritation      methocarbamol (ROBAXIN) 500 mg tablet Take 1 tablet (500 mg total) by mouth daily at bedtime as needed for muscle spasms 30 tablet 0    spironolactone (ALDACTONE) 50 mg tablet Take 50 mg by mouth every morning      tretinoin (RETIN-A) 0.025 % cream Apply 1 Application topically daily       No current facility-administered medications for this visit. Allergies:     No Known Allergies   Physical Exam:     /74 (BP Location: Left arm, Patient Position: Sitting, Cuff Size: Standard)   Pulse 80   Temp 98.3 °F (36.8 °C) (Temporal)   Resp 18   Ht 5' 2" (1.575 m)   Wt 73.9 kg (163 lb)   SpO2 98%   BMI 29.81 kg/m²     Physical Exam  Vitals reviewed. Constitutional:       General: She is not in acute distress. Appearance: Normal appearance. She is not diaphoretic. HENT:      Head: Normocephalic and atraumatic. Right Ear: Tympanic membrane and external ear normal.      Left Ear: Tympanic membrane and external ear normal.      Nose: Nose normal.      Mouth/Throat:      Mouth: Mucous membranes are moist.      Pharynx: Oropharynx is clear. No oropharyngeal exudate or posterior oropharyngeal erythema.    Eyes: Extraocular Movements: Extraocular movements intact. Conjunctiva/sclera: Conjunctivae normal.      Pupils: Pupils are equal, round, and reactive to light. Cardiovascular:      Rate and Rhythm: Normal rate and regular rhythm. Heart sounds: Normal heart sounds. No murmur heard. Pulmonary:      Effort: Pulmonary effort is normal. No respiratory distress. Breath sounds: Normal breath sounds. No wheezing, rhonchi or rales. Abdominal:      General: Bowel sounds are normal. There is no distension. Palpations: Abdomen is soft. There is no mass. Tenderness: There is no abdominal tenderness. There is no guarding. Musculoskeletal:         General: Tenderness (left upper back) present. Cervical back: Neck supple. Back:    Lymphadenopathy:      Cervical: No cervical adenopathy. Right cervical: No superficial, deep or posterior cervical adenopathy. Left cervical: No superficial, deep or posterior cervical adenopathy. Upper Body:      Right upper body: No supraclavicular, axillary, pectoral or epitrochlear adenopathy. Left upper body: No supraclavicular, axillary, pectoral or epitrochlear adenopathy. Skin:     General: Skin is warm and dry. Neurological:      Mental Status: She is alert and oriented to person, place, and time. Mental status is at baseline. Motor: No weakness. Gait: Gait normal.   Psychiatric:         Mood and Affect: Mood normal.         Behavior: Behavior normal.         Thought Content:  Thought content normal.         Judgment: Judgment normal.          Jayjay Suarez, [address] [facility] [place]

## 2023-11-29 ENCOUNTER — TELEMEDICINE (OUTPATIENT)
Dept: INTERNAL MEDICINE CLINIC | Facility: OTHER | Age: 33
End: 2023-11-29
Payer: COMMERCIAL

## 2023-11-29 VITALS — BODY MASS INDEX: 30 KG/M2 | WEIGHT: 163 LBS | HEIGHT: 62 IN

## 2023-11-29 DIAGNOSIS — U07.1 COVID-19: Primary | ICD-10-CM

## 2023-11-29 PROCEDURE — 99213 OFFICE O/P EST LOW 20 MIN: CPT | Performed by: INTERNAL MEDICINE

## 2023-11-29 RX ORDER — BENZONATATE 100 MG/1
100 CAPSULE ORAL 3 TIMES DAILY PRN
Qty: 20 CAPSULE | Refills: 0 | Status: SHIPPED | OUTPATIENT
Start: 2023-11-29

## 2023-11-29 NOTE — ASSESSMENT & PLAN NOTE
Will be from Paxlovid therapy at this time. Discussed over-the-counter medications to take for symptom relief. Will prescribe benzonatate as needed for cough. Recommend that she start using Flonase twice daily. Discussed quarantine protocols per CDC guidelines.

## 2023-11-29 NOTE — LETTER
[address] [facility] [facility] [place]  [place] [address]  Dept: [phone]    November 29, 2023    Patient: Ariella Alberts  YOB: 1990    Ariella Alberts was seen and evaluated at our [facility]. She tested positive for COVID. She may work remotely once feeling better although she is to remain in quarantine through 12/1/2023 as this is 5 days from the onset of symptoms. Upon return, she must then adhere to strict masking for an additional 5 days.     Sincerely,    David Rhodes MD

## 2023-12-30 ENCOUNTER — APPOINTMENT (OUTPATIENT)
Dept: LAB | Facility: IMAGING CENTER | Age: 33
End: 2023-12-30
Payer: COMMERCIAL

## 2023-12-30 DIAGNOSIS — L65.9 NON-SCARRING HAIR LOSS: ICD-10-CM

## 2023-12-30 LAB
25(OH)D3 SERPL-MCNC: 25.9 NG/ML (ref 30–100)
FERRITIN SERPL-MCNC: 18 NG/ML (ref 11–307)
IRON SATN MFR SERPL: 17 % (ref 15–50)
IRON SERPL-MCNC: 53 UG/DL (ref 50–212)
T3FREE SERPL-MCNC: 2.72 PG/ML (ref 2.5–3.9)
T4 FREE SERPL-MCNC: 0.78 NG/DL (ref 0.61–1.12)
TIBC SERPL-MCNC: 315 UG/DL (ref 250–450)
TSH SERPL DL<=0.05 MIU/L-ACNC: 3.28 UIU/ML (ref 0.45–4.5)
UIBC SERPL-MCNC: 262 UG/DL (ref 155–355)

## 2023-12-30 PROCEDURE — 84481 FREE ASSAY (FT-3): CPT

## 2023-12-30 PROCEDURE — 84439 ASSAY OF FREE THYROXINE: CPT

## 2023-12-30 PROCEDURE — 83540 ASSAY OF IRON: CPT

## 2023-12-30 PROCEDURE — 84443 ASSAY THYROID STIM HORMONE: CPT

## 2023-12-30 PROCEDURE — 83550 IRON BINDING TEST: CPT

## 2023-12-30 PROCEDURE — 82728 ASSAY OF FERRITIN: CPT

## 2023-12-30 PROCEDURE — 36415 COLL VENOUS BLD VENIPUNCTURE: CPT

## 2023-12-30 PROCEDURE — 82306 VITAMIN D 25 HYDROXY: CPT

## 2024-01-04 ENCOUNTER — TELEPHONE (OUTPATIENT)
Dept: INTERNAL MEDICINE CLINIC | Facility: OTHER | Age: 34
End: 2024-01-04

## 2024-01-04 DIAGNOSIS — L68.0 FEMALE HIRSUTISM: ICD-10-CM

## 2024-01-04 DIAGNOSIS — E04.1 THYROID NODULE: Primary | ICD-10-CM

## 2024-01-04 DIAGNOSIS — L65.9 HAIR LOSS: ICD-10-CM

## 2024-01-04 DIAGNOSIS — R53.82 CHRONIC FATIGUE: ICD-10-CM

## 2024-01-04 NOTE — TELEPHONE ENCOUNTER
Patient calling because her and Elizabeth have discussed in the past about seeing an endocrinologist but they are requiring a referral be placed in her chart first.  Reason for referral is she has been on the borderline for her thyroid and is having symptoms.  Hair falling out, excessive periods, chronically fatigued, gaining weight.  Would someone be able to address this while Elizabeth is out?

## 2024-01-19 PROBLEM — N92.6 IRREGULAR MENSES: Status: RESOLVED | Noted: 2022-11-02 | Resolved: 2024-01-19

## 2024-01-19 NOTE — PROGRESS NOTES
"Subjective      Natalie Chao is a 33 y.o. female who presents for annual well woman exam.  Last Pap smear 22 NILM/ HR HPV(-). Periods are irregular with PCOS. Has months of spotting along with occasional skipping menses. Was on Loestrin  with improvement of AUB.  Stopped it because she was concerned with hair loss.  Has since met with dermatology.  Is now taking spironolactone.  Hair loss has not changed since stopping OCP.  Is interested in restarting OCP for menstrual control.      Current contraception: none  History of abnormal Pap smear: yes -  ASCUS cannot r/o HSIL; colposcopy CIN1  Family history of uterine or ovarian cancer: no  Regular self breast exam: no  History of abnormal mammogram: to begin at age 40 unless otherwise clinically indicated   Family history of breast cancer: no  History of abnormal lipids: no  Gardasil vaccine:unsure       Menstrual History:  OB History          2    Para   2    Term   2            AB        Living   2         SAB        IAB        Ectopic        Multiple        Live Births   2           Obstetric Comments   Menarche around 7-8 y/o               Menarche age: 7-8  Patient's last menstrual period was 2023.  Period Pattern: (!) Irregular (every day since 23)  Menstrual Flow: Moderate, Light  Dysmenorrhea: None    The following portions of the patient's history were reviewed and updated as appropriate: allergies, current medications, past family history, past medical history, past social history, past surgical history, and problem list.    Review of Systems  Pertinent items are noted in HPI.      Objective      /80   Ht 5' 2\" (1.575 m)   Wt 72.9 kg (160 lb 12.8 oz)   LMP 2023   BMI 29.41 kg/m²     General:   alert and oriented, in no acute distress, alert, appears stated age, and cooperative   Heart: regular rate and rhythm, S1, S2 normal, no murmur, click, rub or gallop   Lungs: clear to auscultation bilaterally "   Abdomen: soft, non-tender, without masses or organomegaly   Vulva: normal, Bartholin's, Urethra, [place]'s normal   Vagina: normal mucosa, normal discharge, no palpable nodules   Cervix: no cervical motion tenderness and no lesions   Uterus: normal size, non-tender, normal shape and consistency   Adnexa: normal adnexa and no mass, fullness, tenderness   Breast: breasts appear normal, no suspicious masses, no skin or nipple changes or axillary nodes.              Assessment      @well woman  no contraindication to begin hormonal therapy@ .     Plan      All questions answered.  Await pap smear results.  Breast self exam technique reviewed and patient encouraged to perform self-exam monthly.  Contraception: Rx kaur reviewed quick start method.  Reviewed common side effects including nausea, irregular bleeding and breast tenderness.  Aches reviewed.  Written information provided.  Diagnosis explained in detail, including differential.  Dietary diary.  Discussed healthy lifestyle modifications.  Educational material distributed.  Follow up in 3 months for pill check and 1 year for annual exam.  Follow up as needed.  Breast awareness reviewed   Encouraged to keep appointment with endocrinology 4/2024  Encouraged healthy diet, exercise and lifestyle  Encouraged follow-up with PCP as needed  Encouraged seasonal influenza vaccination  Gardasil vaccine series reviewed.  Written information provided.   Will call / Agentek message with results  VBI-    BMI Counseling: Body mass index is 29.41 kg/m². The BMI is above normal. Nutrition recommendations include reducing portion sizes and 3-5 servings of fruits/vegetables daily. Exercise recommendations include exercising 3-5 times per week.

## 2024-01-22 ENCOUNTER — ANNUAL EXAM (OUTPATIENT)
Dept: OBGYN CLINIC | Facility: MEDICAL CENTER | Age: 34
End: 2024-01-22
Payer: COMMERCIAL

## 2024-01-22 VITALS
SYSTOLIC BLOOD PRESSURE: 115 MMHG | HEIGHT: 62 IN | WEIGHT: 160.8 LBS | DIASTOLIC BLOOD PRESSURE: 80 MMHG | BODY MASS INDEX: 29.59 KG/M2

## 2024-01-22 DIAGNOSIS — E28.2 POLYCYSTIC OVARIAN SYNDROME: ICD-10-CM

## 2024-01-22 DIAGNOSIS — Z01.419 WELL WOMAN EXAM WITH ROUTINE GYNECOLOGICAL EXAM: Primary | ICD-10-CM

## 2024-01-22 DIAGNOSIS — N93.9 ABNORMAL UTERINE BLEEDING (AUB): ICD-10-CM

## 2024-01-22 PROBLEM — Z00.00 ANNUAL PHYSICAL EXAM: Status: RESOLVED | Noted: 2022-11-02 | Resolved: 2024-01-22

## 2024-01-22 PROBLEM — R10.2 PELVIC PAIN: Status: RESOLVED | Noted: 2021-08-06 | Resolved: 2024-01-22

## 2024-01-22 PROCEDURE — G0476 HPV COMBO ASSAY CA SCREEN: HCPCS | Performed by: NURSE PRACTITIONER

## 2024-01-22 PROCEDURE — G0145 SCR C/V CYTO,THINLAYER,RESCR: HCPCS | Performed by: NURSE PRACTITIONER

## 2024-01-22 PROCEDURE — S0612 ANNUAL GYNECOLOGICAL EXAMINA: HCPCS | Performed by: NURSE PRACTITIONER

## 2024-01-22 RX ORDER — DROSPIRENONE AND ETHINYL ESTRADIOL 0.03MG-3MG
1 KIT ORAL DAILY
Qty: 30 TABLET | Refills: 4 | Status: SHIPPED | OUTPATIENT
Start: 2024-01-22

## 2024-01-22 RX ORDER — CLINDAMYCIN PHOSPHATE 10 MG/G
GEL TOPICAL
COMMUNITY
Start: 2023-12-20

## 2024-01-26 LAB
LAB AP GYN PRIMARY INTERPRETATION: NORMAL
Lab: NORMAL

## 2024-02-08 ENCOUNTER — OFFICE VISIT (OUTPATIENT)
Dept: BARIATRICS | Facility: CLINIC | Age: 34
End: 2024-02-08
Payer: COMMERCIAL

## 2024-02-08 VITALS
WEIGHT: 159 LBS | HEART RATE: 55 BPM | HEIGHT: 62 IN | DIASTOLIC BLOOD PRESSURE: 76 MMHG | SYSTOLIC BLOOD PRESSURE: 120 MMHG | RESPIRATION RATE: 16 BRPM | BODY MASS INDEX: 29.26 KG/M2

## 2024-02-08 DIAGNOSIS — E66.3 OVERWEIGHT: Primary | ICD-10-CM

## 2024-02-08 DIAGNOSIS — E04.1 THYROID NODULE: ICD-10-CM

## 2024-02-08 DIAGNOSIS — E28.2 POLYCYSTIC OVARIAN SYNDROME: ICD-10-CM

## 2024-02-08 PROCEDURE — 99214 OFFICE O/P EST MOD 30 MIN: CPT | Performed by: NURSE PRACTITIONER

## 2024-02-08 NOTE — PATIENT INSTRUCTIONS
I am sending the Wegovy to your pharmacy. This will start the prior authorization process. My office takes care of that. It can take up to 3 weeks to process. Start Wegovy 0.25 mg subcutaneously once a week for 4 weeks, then increase to 0.5 mg subcutaneously each week. After you take the first pen of the starting dose of 0.25 mg, please message me with an update regarding how you are feeling. As long as you are tolerating it well, I will submit the next dose of 0.5 mg to the pharmacy, as we will also likely need to do another prior authorization for that dose.     - Side effects of Wegovy discussed: nausea, vomiting, diarrhea, and constipation. If severe abdominal pain develops, stop Wegovy and go to the ER, as this could be pancreatitis. Monitor heart rate while on Wegovy and if resting heart rate greater than 100 beats per minute, patient will notify me.     - If you need to have surgery or another procedure, such as an upper endoscopy or colonoscopy, please contact my office as often medications like Wegovy need to be held for a certain amount of time prior to a procedure.

## 2024-02-08 NOTE — PROGRESS NOTES
Assessment/Plan:     Overweight  - Patient is pursuing Conservative Program with body comp testing.  - Initial weight loss goal of 5-10% weight loss for improved health.   - On OCP  - Topamax in the past caused mood changes.   - Wellbutrin in the past - no difference with appetite.  - Phentermine in the past was effective at first, but then lost efficacy.   - Metformin in the past - no difference with appetite.   - Was doing well with Wegovy, but could not find 1 mg dose.   - Changed to Saxenda, but developed nausea and diarrhea on 2.4 mg dose and therefore stopped.   - Discussed potentially trying Wellbutrin and Naltrexone to mimic Contrave, but she would like to try to restart Wegovy. Aware supply chain still affecting first 3 starting doses.   - Medication contract signed 2/8/2024.    Start Wegovy 0.25 mg subcutaneously once a week for 4 weeks, then increase to 0.5 mg subcutaneously each week. After you take the first pen of the starting dose of 0.25 mg, please message me with an update regarding how you are feeling. As long as you are tolerating it well, I will submit the next dose of 0.5 mg to the pharmacy, as we will also likely need to do another prior authorization for that dose.   - Side effects of Wegovy discussed: nausea, vomiting, diarrhea, and constipation. If severe abdominal pain develops, stop Wegovy and go to the ER, as this could be pancreatitis. Monitor heart rate while on Wegovy and if resting heart rate greater than 100 beats per minute, patient will notify me.   - If you need to have surgery or another procedure, such as an upper endoscopy or colonoscopy, please contact my office as often medications like Wegovy need to be held for a certain amount of time prior to a procedure.   - Thyroid ultrasound 12/5/2022: Stable thyroid nodule. No nodule meets current ACR criteria for requiring biopsy or followup ultrasounds.   - Patient denies personal history of pancreatitis. Patient also denies  personal and family history of thyroid cancer and multiple endocrine neoplasia type 2 (MEN 2 tumor).  - Reviewed medullary thyroid cancer risk and GLP-1 therapy, but she would like to continue with plan for Wegovy. She will update me if she notices an increase in thyroid nodule size.   - Labs reviewed: Free T3 and T4, and TSH. BMP and CBC 9/13/2023. Blood work within acceptable range.       Initial: 157.2 lbs BMI 28.13  Last visit: 164.1 lbs BMI 28.63  Current: 159 lbs BMI 29.08  Change: +2 lbs (-5 lbs since the last office visit)     Goals:  Keep up the great work with food logging.  6909-2422 calories on sedentary days and flex to 1400 on active days.  Keep up the great work with water intake, at least 64 oz daily.  Keep up the great work with exercise.  Start Wegovy       Polycystic ovarian syndrome  - Taking OCPs. Continue management with prescribing provider.         Thyroid nodule  - Last thyroid ultrasound 12/5/2022. Stable thyroid nodule. No nodule meets current ACR criteria for requiring biopsy or followup ultrasounds.          Natalie was seen today for follow-up.    Diagnoses and all orders for this visit:    Overweight  -     Semaglutide-Weight Management (WEGOVY) 0.25 MG/0.5ML; Inject 0.5 mL (0.25 mg total) under the skin once a week    Polycystic ovarian syndrome  -     Semaglutide-Weight Management (WEGOVY) 0.25 MG/0.5ML; Inject 0.5 mL (0.25 mg total) under the skin once a week    Thyroid nodule            Follow up in approximately  2 month nurse visit and 4 months  with Non-Surgical Physician/Advanced Practitioner.    Subjective:   Chief Complaint   Patient presents with    Follow-up     [facility]WM- 2/3 [facility] f/u       Patient ID: Natalie Chao  is a 34 y.o. female with excess weight/obesity here to pursue weight management.  Patient is pursuing Conservative Program.   Most recent notes and records were reviewed.    HPI    Wt Readings from Last 10 Encounters:   02/08/24 72.1 kg (159 lb)   01/22/24 72.9 kg  (160 lb 12.8 oz)   11/29/23 73.9 kg (163 lb)   11/16/23 73.9 kg (163 lb)   10/10/23 72.6 kg (160 lb)   09/25/23 72.6 kg (160 lb)   08/18/23 74.4 kg (164 lb)   05/11/23 74.4 kg (164 lb)   03/24/23 74.5 kg (164 lb 3.2 oz)   01/20/23 74.6 kg (164 lb 8 oz)     Was on Wegovy and doing well, but could not continue it, as she could not find the 1 mg dose.      Then started Saxenda, but developed nausea and diarrhea on week 4 (2.4 mg dose) and then stopped it.     Metformin in the past - no difference with appetite.      Was previously on Topamax and phentermine and had mood changes.     Tried phentermine alone, was helpful at first, but then lost efficacy.    Wellbutrin - no difference with appetite.      Feels hungry often. Catamenial cravings.     Has been food logging. Getting 1786-2415 calories per day.     B: quest protein shake and fruit and yogurt or protein oatmeal  S: none or protein bar  L: leftovers  S: none  D: brown rice and veggies and chicken   S: none or rice cake with almond butter and banana or protein ice cream        Hydration- 5-6 bottles water, 1 cup matcha with almond milk creamer  Alcohol- none  Exercise- gym 5-6 days per week 1 hour - treadmill 20-30 minutes, weights and stepper remainder of time  Occupation- [employer]   Sleep- 8 hours    Colonoscopy: had 2021, not due again until age 44 unless new symptoms  Mammogram: N/A      The following portions of the patient's history were reviewed and updated as appropriate: allergies, current medications, past family history, past medical history, past social history, past surgical history, and problem list.    Family History   Problem Relation Age of Onset    No Known Problems Mother     No Known Problems Father     No Known Problems Brother     Diabetes Maternal Grandmother     No Known Problems Brother     Dementia Maternal Grandfather     Diabetes Paternal Grandmother     No Known Problems Paternal Grandfather     No Known Problems Son     No  "Known Problems Son     Colon cancer Neg Hx     Breast cancer Neg Hx     Ovarian cancer Neg Hx         Review of Systems   HENT:  Negative for sore throat.    Respiratory:  Negative for cough and shortness of breath.    Cardiovascular:  Negative for chest pain and palpitations.   Gastrointestinal:  Negative for abdominal pain, constipation, diarrhea, nausea and vomiting.        + GERD diet controlled and PRN tums   Musculoskeletal:  Negative for arthralgias and back pain.   Skin:  Negative for rash.   Psychiatric/Behavioral:  Negative for suicidal ideas (or HI).         + depression and anxiety        Objective:  /76   Pulse 55   Resp 16   Ht 5' 2\" (1.575 m)   Wt 72.1 kg (159 lb)   LMP 12/18/2023   BMI 29.08 kg/m²     Physical Exam  Vitals and nursing note reviewed.        Constitutional   General appearance: Abnormal.  well developed and overweight.   Eyes No conjunctival injection.   Ears, Nose, Mouth, and Throat Oral mucosa moist.   Pulmonary   Respiratory effort: No increased work of breathing or signs of respiratory distress.    Cardiovascular   Examination of extremities for edema and/or varicosities: Normal.  no edema.   Abdomen   Abdomen: Abnormal overweight.     Musculoskeletal   Normal range of motion  Neurological   Gait and station: Normal.   Psychiatric   Orientation to person, place and time: Normal.    Affect: appropriate        "

## 2024-02-08 NOTE — ASSESSMENT & PLAN NOTE
- Patient is pursuing Conservative Program with body comp testing.  - Initial weight loss goal of 5-10% weight loss for improved health.   - On OCP  - Topamax in the past caused mood changes.   - Wellbutrin in the past - no difference with appetite.  - Phentermine in the past was effective at first, but then lost efficacy.   - Metformin in the past - no difference with appetite.   - Was doing well with Wegovy, but could not find 1 mg dose.   - Changed to Saxenda, but developed nausea and diarrhea on 2.4 mg dose and therefore stopped.   - Discussed potentially trying Wellbutrin and Naltrexone to mimic Contrave, but she would like to try to restart Wegovy. Aware supply chain still affecting first 3 starting doses.   - Medication contract signed 2/8/2024.    Start Wegovy 0.25 mg subcutaneously once a week for 4 weeks, then increase to 0.5 mg subcutaneously each week. After you take the first pen of the starting dose of 0.25 mg, please message me with an update regarding how you are feeling. As long as you are tolerating it well, I will submit the next dose of 0.5 mg to the pharmacy, as we will also likely need to do another prior authorization for that dose.   - Side effects of Wegovy discussed: nausea, vomiting, diarrhea, and constipation. If severe abdominal pain develops, stop Wegovy and go to the ER, as this could be pancreatitis. Monitor heart rate while on Wegovy and if resting heart rate greater than 100 beats per minute, patient will notify me.   - If you need to have surgery or another procedure, such as an upper endoscopy or colonoscopy, please contact my office as often medications like Wegovy need to be held for a certain amount of time prior to a procedure.   - Thyroid ultrasound 12/5/2022: Stable thyroid nodule. No nodule meets current ACR criteria for requiring biopsy or followup ultrasounds.   - Patient denies personal history of pancreatitis. Patient also denies personal and family history of thyroid  cancer and multiple endocrine neoplasia type 2 (MEN 2 tumor).  - Reviewed medullary thyroid cancer risk and GLP-1 therapy, but she would like to continue with plan for Wegovy. She will update me if she notices an increase in thyroid nodule size.   - Labs reviewed: Free T3 and T4, and TSH. BMP and CBC 9/13/2023. Blood work within acceptable range.       Initial: 157.2 lbs BMI 28.13  Last visit: 164.1 lbs BMI 28.63  Current: 159 lbs BMI 29.08  Change: +2 lbs (-5 lbs since the last office visit)     Goals:  Keep up the great work with food logging.  8116-1414 calories on sedentary days and flex to 1400 on active days.  Keep up the great work with water intake, at least 64 oz daily.  Keep up the great work with exercise.  Start Wegovy

## 2024-02-08 NOTE — ASSESSMENT & PLAN NOTE
- Last thyroid ultrasound 12/5/2022. Stable thyroid nodule. No nodule meets current ACR criteria for requiring biopsy or followup ultrasounds.

## 2024-03-07 ENCOUNTER — PATIENT MESSAGE (OUTPATIENT)
Dept: BARIATRICS | Facility: CLINIC | Age: 34
End: 2024-03-07

## 2024-03-08 ENCOUNTER — PATIENT MESSAGE (OUTPATIENT)
Dept: BARIATRICS | Facility: CLINIC | Age: 34
End: 2024-03-08

## 2024-03-08 ENCOUNTER — TELEPHONE (OUTPATIENT)
Dept: BARIATRICS | Facility: CLINIC | Age: 34
End: 2024-03-08

## 2024-03-08 DIAGNOSIS — E66.3 OVERWEIGHT: Primary | ICD-10-CM

## 2024-03-08 DIAGNOSIS — E28.2 POLYCYSTIC OVARIAN SYNDROME: ICD-10-CM

## 2024-03-08 RX ORDER — TIRZEPATIDE 2.5 MG/.5ML
2.5 INJECTION, SOLUTION SUBCUTANEOUS WEEKLY
Qty: 2 ML | Refills: 0 | Status: SHIPPED | OUTPATIENT
Start: 2024-03-08 | End: 2024-04-05

## 2024-03-08 NOTE — TELEPHONE ENCOUNTER
Needs prior auth for Zepbound. Had side effects from Saxenda in the past. Was on Wegovy, but could not find the needed doses in the past. Cannot currently find starting dose of Wegovy.

## 2024-03-12 NOTE — TELEPHONE ENCOUNTER
PA for Zepbound    Submitted via    []CMM-KEY    [x][employer]-Case ID # [id number]   []Faxed to plan   []Other website    []Phone call Case ID #      Office notes sent, clinical questions answered. Awaiting determination    Turnaround time for your insurance to make a decision on your Prior Authorization can take 7-21 business days.

## 2024-03-25 DIAGNOSIS — E28.2 POLYCYSTIC OVARIAN SYNDROME: ICD-10-CM

## 2024-03-25 DIAGNOSIS — N93.9 ABNORMAL UTERINE BLEEDING (AUB): ICD-10-CM

## 2024-03-25 RX ORDER — DROSPIRENONE AND ETHINYL ESTRADIOL 0.03MG-3MG
1 KIT ORAL DAILY
Qty: 90 TABLET | Refills: 3 | Status: SHIPPED | OUTPATIENT
Start: 2024-03-25

## 2024-04-08 ENCOUNTER — CLINICAL SUPPORT (OUTPATIENT)
Dept: BARIATRICS | Facility: CLINIC | Age: 34
End: 2024-04-08

## 2024-04-08 VITALS — BODY MASS INDEX: 29.26 KG/M2 | RESPIRATION RATE: 16 BRPM | TEMPERATURE: 98.7 F | HEIGHT: 62 IN | WEIGHT: 159 LBS

## 2024-04-08 DIAGNOSIS — R63.5 ABNORMAL WEIGHT GAIN: Primary | ICD-10-CM

## 2024-04-08 PROCEDURE — RECHECK

## 2024-04-08 NOTE — PROGRESS NOTES
Patient last visit weight:159lb  Patient current visit weight:159lb    If you are taking phentermine or other oral weight loss medications, are you experiencing any of the following symptoms:  Headache:   Blurred Vision:   Chest Pain:   Palpitations:  Insomnia:   SPECIFY ORAL MEDICATION AND DOSAGE:     If you are taking an injectable medication,  are you experiencing any of the following symptoms:  Bloating:  No  Nausea: No  Vomiting:  No  Constipation: no  Diarrhea: no  SPECIFY INJECTABLE MEDICATION AND CURRENT DOSAGE: Zepbound 2.5mg  Pt stated she still hasn't received the medication and maybe needs a new script as well and the PA team has not informed her if her Zepnound was approved, she wants you to reach out to the Pod[employer] and see on the PA        Vitals:    Is BP less than 100/60? no  Is BP greater than 140/90? no  Is HR greater than 100? no  **If yes to any of the above, have patient relax and repeat in 5-10 minutes**    Repeat values:    Is BP less than 100/60?  Is BP greater than 140/90?  Is HR greater than 100?  **If values remain outside of ranges above, please consult provider for next steps**

## 2024-04-16 DIAGNOSIS — E28.2 POLYCYSTIC OVARIAN SYNDROME: ICD-10-CM

## 2024-04-16 DIAGNOSIS — E66.3 OVERWEIGHT: ICD-10-CM

## 2024-04-16 RX ORDER — TIRZEPATIDE 2.5 MG/.5ML
2.5 INJECTION, SOLUTION SUBCUTANEOUS WEEKLY
Qty: 2 ML | Refills: 0 | Status: SHIPPED | OUTPATIENT
Start: 2024-04-16 | End: 2024-05-14

## 2024-04-30 ENCOUNTER — CONSULT (OUTPATIENT)
Dept: ENDOCRINOLOGY | Facility: CLINIC | Age: 34
End: 2024-04-30
Payer: COMMERCIAL

## 2024-04-30 VITALS
WEIGHT: 157 LBS | HEART RATE: 68 BPM | SYSTOLIC BLOOD PRESSURE: 116 MMHG | HEIGHT: 62 IN | BODY MASS INDEX: 28.89 KG/M2 | DIASTOLIC BLOOD PRESSURE: 82 MMHG

## 2024-04-30 DIAGNOSIS — E28.2 POLYCYSTIC OVARIAN SYNDROME: Primary | ICD-10-CM

## 2024-04-30 DIAGNOSIS — L68.0 FEMALE HIRSUTISM: ICD-10-CM

## 2024-04-30 DIAGNOSIS — E55.9 VITAMIN D DEFICIENCY: ICD-10-CM

## 2024-04-30 DIAGNOSIS — L65.9 HAIR LOSS: ICD-10-CM

## 2024-04-30 DIAGNOSIS — E04.1 THYROID NODULE: ICD-10-CM

## 2024-04-30 DIAGNOSIS — Z86.32 H/O GESTATIONAL DIABETES MELLITUS, NOT CURRENTLY PREGNANT: ICD-10-CM

## 2024-04-30 DIAGNOSIS — R53.82 CHRONIC FATIGUE: ICD-10-CM

## 2024-04-30 PROCEDURE — 99214 OFFICE O/P EST MOD 30 MIN: CPT | Performed by: INTERNAL MEDICINE

## 2024-04-30 NOTE — PROGRESS NOTES
Natalie Chao 34 y.o. female MRN: [id number]    Encounter: [id number]      Assessment/Plan     Assessment:  This is a 34 y.o.-year-old female with Polycystic ovarian disease     Plan:  Diagnoses and all orders for this visit:    Polycystic ovarian syndrome  With history of hirsutism, irregular menstruation which fits criteria for polycystic ovarian syndrome  Patient also gives history of gestational diabetes however most recent A1c 2 years ago was normal  Will obtain hemoglobin A1c  Educated about lifestyle modifications, importance of weight loss  So discussed the risk of coronary artery disease and stroke and polycystic ovarian syndrome and importance of lifestyle modifications  -     Basic metabolic panel; Future  -     Hemoglobin A1C; Future    Thyroid nodule  Nodule has been stable on ultrasound, obtain thyroid blood work including antibodies to rule out Hashimoto thyroiditis  -     Ambulatory Referral to Endocrinology  -     T4, free; Future  -     TSH, 3rd generation; Future  -     Thyroid Antibodies Panel; Future    Female hirsutism  Improved since she was started on spironolactone by dermatology  -     Ambulatory Referral to Endocrinology    Chronic fatigue  Obtain vitamin B12 and vitamin D  -     Ambulatory Referral to Endocrinology    Hair loss  Obtain B12 level as well as vitamin D  -     Ambulatory Referral to Endocrinology  -     Vitamin D 25 hydroxy; Future  -     Vitamin B12; Future    Vitamin D deficiency  -     Vitamin D 25 hydroxy; Future    H/O gestational diabetes mellitus, not currently pregnant  Pt gives history of gestational diabetes during her both pregnancies and has increased risk of diabetes.  Will obtain fasting basic metabolic profile as well as A1c  -     Basic metabolic panel; Future  -     Hemoglobin A1C; Future  -     Basic metabolic panel; Future  -     Hemoglobin A1C; Future         CC:   Polycystic ovarian syndrome    History of Present Illness     HPI:  Natalie Chao is  34-year-old woman with medical history of polycystic ovarian syndrome, history of irregular menstruation, hirsutism, thyroid nodule, weight gain is referred here for evaluation of symptoms of hair loss, weight gain, thyroid nodule    Age of menarche- since 7 yrs old , periods were always irregular since then.  She was started on OC pills at age of 15 years for 6 months, and periods were regular on OC pills,it was stopped after 6 months  She got pregnant at age of 21 with out any issues ( no h/o infertility)   She got pregnant second time at age of 29 yr old   She had H/o stational diabetes during both pregnancies.  He also complains of facial hair, has pluck facial hair every week.  She is currently taking Darcie, OC pills, started 4 months ago because of irregular menses with heavy flow and stomach cramps.  Her symptoms have improved since she was started on he has been on OC pills  She denies taking over-the-counter herbal supplementation  Previously she had a testosterone level done which was normal      She denies history of thyroid problems other than thyroid nodule which has been stable on ultrasound which does not need biopsy or follow-up ultrasound.  She has history of hypothyroidism and family.  She denies family history of thyroid cancer.    Lab Results   Component Value Date    HGBA1C 5.3 04/22/2022         Lab Results   Component Value Date    HGBA1C 5.3 04/22/2022           Thyroid USG   THYROID ULTRASOUND     INDICATION:    E04.1: Nontoxic single thyroid nodule.     COMPARISON:  Thyroid ultrasound dated 8/12/2021.     TECHNIQUE:   Ultrasound of the thyroid was performed with a high frequency linear transducer in transverse and sagittal planes including volumetric imaging sweeps as well as traditional still imaging technique.     FINDINGS:  Homogeneous smooth echotexture.     Right lobe: 4.5 x 1.1 x 1.8 cm. Volume 4.4 mL  Left lobe:  5.0 x 1.2 x 1.5 cm. Volume 4.3 mL  Isthmus: 0.1  cm.     Nodule #1.   Image 27.  Left mid/lower pole nodule measuring 0.8 x 0.4 x 0.5 cm.  Stable.   COMPOSITION:  2 points, solid or almost completely solid .  ECHOGENICITY:  2 points, hypoechoic.    SHAPE:  0 points, wider-than-tall.    MARGIN: 0 points, ill-defined.  ECHOGENIC FOCI:  0 points, none or large comet-tail artifacts.  TI-RADS Classification: TR 4 (4-6 points), FNA if > 1.5 cm. Follow if > 1cm.     IMPRESSION:     Stable thyroid nodule. No nodule meets current ACR criteria for requiring biopsy or followup ultrasounds.     Review of Systems   Constitutional:  Negative for activity change, diaphoresis, fatigue, fever and unexpected weight change.   HENT: Negative.     Eyes:  Negative for visual disturbance.   Respiratory:  Negative for cough, chest tightness and shortness of breath.    Cardiovascular:  Negative for chest pain, palpitations and leg swelling.   Gastrointestinal:  Negative for abdominal pain, blood in stool, constipation, diarrhea, nausea and vomiting.   Endocrine: Negative for cold intolerance, heat intolerance, polydipsia, polyphagia and polyuria.   Genitourinary:  Negative for dysuria, enuresis, frequency and urgency.   Musculoskeletal:  Negative for arthralgias and myalgias.   Skin:  Negative for pallor, rash and wound.   Allergic/Immunologic: Negative.    Neurological:  Negative for dizziness, tremors, weakness and numbness.   Hematological: Negative.    Psychiatric/Behavioral: Negative.         Historical Information   Past Medical History:   Diagnosis Date    Abnormal Pap smear of cervix     Anemia     Anxiety     Asthma     COVID-19 virus detected 06/19/2020    Formatting of this note might be different from the original. 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 Tested positive on 6/16/2020.  Staff must wear proper PPE at pt vero'ts - mask, gown, gloves.   Last Assessment & Plan:  Formatting of this note might be different from the  original. Tested positive 20 Denies symptoms Sche[place]    Depression     Gestational diabetes     IBS (irritable bowel syndrome)     PCOS (polycystic ovarian syndrome)      Past Surgical History:   Procedure Laterality Date     SECTION  2020    COLONOSCOPY  2015    NM TONSILLECTOMY PRIMARY/SECONDARY AGE 11/> N/A 2023    Procedure: TONSILLECTOMY;  Surgeon: Josh Reddy MD;  Location: [facility];  Service: ENT    WISDOM TOOTH EXTRACTION       Social History   Social History     Substance and Sexual Activity   Alcohol Use Yes    Comment: Rare Socially     Social History     Substance and Sexual Activity   Drug Use Never     Social History     Tobacco Use   Smoking Status Never   Smokeless Tobacco Never     Family History:   Family History   Problem Relation Age of Onset    No Known Problems Mother     No Known Problems Father     No Known Problems Brother     Diabetes Maternal Grandmother     No Known Problems Brother     Dementia Maternal Grandfather     Diabetes Paternal Grandmother     No Known Problems Paternal Grandfather     No Known Problems Son     No Known Problems Son     Colon cancer Neg Hx     Breast cancer Neg Hx     Ovarian cancer Neg Hx        Meds/Allergies   Current Outpatient Medications   Medication Sig Dispense Refill    clindamycin (CLINDAGEL) 1 % gel       drospirenone-ethinyl estradiol (MARY) 3-0.03 MG per tablet Take 1 tablet by mouth daily 90 tablet 3    hydrocortisone 2.5 % cream Apply 1 Application topically if needed for irritation      spironolactone (ALDACTONE) 50 mg tablet Take 50 mg by mouth every morning      tirzepatide (Zepbound) 2.5 mg/0.5 mL auto-injector Inject 0.5 mL (2.5 mg total) under the skin once a week for 28 days 2 mL 0    tretinoin (RETIN-A) 0.025 % cream Apply 1 Application topically daily      methocarbamol (ROBAXIN) 500 mg tablet Take 1 tablet (500 mg total) by mouth daily at bedtime as needed for muscle spasms (Patient not taking: Reported  "on 4/8/2024) 30 tablet 0     No current facility-administered medications for this visit.     No Known Allergies    Objective   Vitals: Blood pressure 116/82, pulse 68, height 5' 2\" (1.575 m), weight 71.2 kg (157 lb), not currently breastfeeding.    Physical Exam  Vitals reviewed.   Constitutional:       General: She is not in acute distress.     Appearance: Normal appearance. She is not ill-appearing.   HENT:      Head: Normocephalic and atraumatic.      Nose: Nose normal.   Eyes:      Extraocular Movements: Extraocular movements intact.      Conjunctiva/sclera: Conjunctivae normal.   Pulmonary:      Effort: No respiratory distress.   Musculoskeletal:      Cervical back: Normal range of motion.   Neurological:      General: No focal deficit present.      Mental Status: She is alert and oriented to person, place, and time.   Psychiatric:         Mood and Affect: Mood normal.         Behavior: Behavior normal.         The history was obtained from the review of the chart, patient.    Lab Results:   Lab Results   Component Value Date/Time    TSH 3RD GENERATON 3.282 12/30/2023 07:03 AM    Free T4 0.78 12/30/2023 07:03 AM       Imaging Studies:   Results for orders placed during the hospital encounter of 12/05/22    US thyroid    Impression  Stable thyroid nodule. No nodule meets current ACR criteria for requiring biopsy or followup ultrasounds.    Reference: ACR Thyroid Imaging, Reporting and Data System (TI-RADS): White Paper of the ACR TI-RADS Committee. J AM Staci Radiol 2017;14:587-595. (additional recommendations based on American Thyroid Association 2015 guidelines.)      Workstation performed: [id number]      I have personally reviewed pertinent reports.      Portions of the record may have been created with voice recognition software. Occasional wrong word or \"sound a like\" substitutions may have occurred due to the inherent limitations of voice recognition software. Read the chart carefully and recognize, using " context, where substitutions have occurred.

## 2024-05-21 ENCOUNTER — PATIENT MESSAGE (OUTPATIENT)
Dept: BARIATRICS | Facility: CLINIC | Age: 34
End: 2024-05-21

## 2024-05-21 DIAGNOSIS — E28.2 POLYCYSTIC OVARIAN SYNDROME: ICD-10-CM

## 2024-05-21 DIAGNOSIS — E66.3 OVERWEIGHT: Primary | ICD-10-CM

## 2024-05-21 RX ORDER — TIRZEPATIDE 5 MG/.5ML
5 INJECTION, SOLUTION SUBCUTANEOUS WEEKLY
Qty: 2 ML | Refills: 1 | Status: SHIPPED | OUTPATIENT
Start: 2024-05-21 | End: 2024-07-16

## 2024-05-21 RX ORDER — TIRZEPATIDE 5 MG/.5ML
5 INJECTION, SOLUTION SUBCUTANEOUS WEEKLY
Qty: 2 ML | Refills: 1 | Status: SHIPPED | OUTPATIENT
Start: 2024-05-21 | End: 2024-05-21 | Stop reason: SDUPTHER

## 2024-07-10 DIAGNOSIS — E28.2 POLYCYSTIC OVARIAN SYNDROME: ICD-10-CM

## 2024-07-10 DIAGNOSIS — E66.3 OVERWEIGHT: ICD-10-CM

## 2024-07-10 RX ORDER — TIRZEPATIDE 5 MG/.5ML
5 INJECTION, SOLUTION SUBCUTANEOUS WEEKLY
Qty: 2 ML | Refills: 1 | Status: SHIPPED | OUTPATIENT
Start: 2024-07-10 | End: 2024-09-04

## 2024-08-14 ENCOUNTER — CLINICAL SUPPORT (OUTPATIENT)
Dept: BARIATRICS | Facility: CLINIC | Age: 34
End: 2024-08-14

## 2024-08-14 VITALS
TEMPERATURE: 97.3 F | HEART RATE: 90 BPM | SYSTOLIC BLOOD PRESSURE: 100 MMHG | RESPIRATION RATE: 17 BRPM | WEIGHT: 136.4 LBS | DIASTOLIC BLOOD PRESSURE: 75 MMHG | HEIGHT: 62 IN | BODY MASS INDEX: 25.1 KG/M2

## 2024-08-14 DIAGNOSIS — R11.0 NAUSEA: Primary | ICD-10-CM

## 2024-08-14 DIAGNOSIS — R63.5 ABNORMAL WEIGHT GAIN: Primary | ICD-10-CM

## 2024-08-14 PROCEDURE — RECHECK

## 2024-08-14 RX ORDER — ONDANSETRON 4 MG/1
4 TABLET, FILM COATED ORAL EVERY 8 HOURS PRN
Qty: 20 TABLET | Refills: 0 | Status: SHIPPED | OUTPATIENT
Start: 2024-08-14

## 2024-08-14 NOTE — PROGRESS NOTES
Patient last visit weight: 159 lb  Patient current visit weight:136.4 lb    If you are taking phentermine or other oral weight loss medications, are you experiencing any of the following symptoms:  Headache:   Blurred Vision:   Chest Pain:   Palpitations:  Insomnia:   SPECIFY ORAL MEDICATION AND DOSAGE:     If you are taking an injectable medication,  are you experiencing any of the following symptoms:  Bloating:  No  Nausea: Little bit , after injecting  Vomiting:  No  Constipation:  No  Diarrhea: No  SPECIFY INJECTABLE MEDICATION AND CURRENT DOSAGE: Zepbound 5 mg   Pt asking for Zofran       Vitals:    Is BP less than 100/60? No  Is BP greater than 140/90? No  Is HR greater than 100? No  **If yes to any of the above, have patient relax and repeat in 5-10 minutes**    Repeat values:    Is BP less than 100/60?  Is BP greater than 140/90?  Is HR greater than 100?  **If values remain outside of ranges above, please consult provider for next steps**

## 2024-09-16 DIAGNOSIS — E66.3 OVERWEIGHT: ICD-10-CM

## 2024-09-16 DIAGNOSIS — E66.3 OVERWEIGHT: Primary | ICD-10-CM

## 2024-09-16 RX ORDER — TIRZEPATIDE 5 MG/.5ML
5 INJECTION, SOLUTION SUBCUTANEOUS WEEKLY
Qty: 2 ML | Refills: 4 | Status: SHIPPED | OUTPATIENT
Start: 2024-09-16 | End: 2024-09-16 | Stop reason: SDUPTHER

## 2024-09-16 RX ORDER — TIRZEPATIDE 5 MG/.5ML
5 INJECTION, SOLUTION SUBCUTANEOUS WEEKLY
Qty: 2 ML | Refills: 4 | Status: SHIPPED | OUTPATIENT
Start: 2024-09-16

## 2024-10-18 ENCOUNTER — OFFICE VISIT (OUTPATIENT)
Age: 34
End: 2024-10-18
Payer: COMMERCIAL

## 2024-10-18 VITALS
BODY MASS INDEX: 25.03 KG/M2 | DIASTOLIC BLOOD PRESSURE: 90 MMHG | HEART RATE: 89 BPM | SYSTOLIC BLOOD PRESSURE: 112 MMHG | WEIGHT: 136 LBS | HEIGHT: 62 IN

## 2024-10-18 DIAGNOSIS — M54.6 CHRONIC BILATERAL THORACIC BACK PAIN: ICD-10-CM

## 2024-10-18 DIAGNOSIS — M54.50 CHRONIC BILATERAL LOW BACK PAIN WITHOUT SCIATICA: Primary | ICD-10-CM

## 2024-10-18 DIAGNOSIS — G89.29 CHRONIC BILATERAL LOW BACK PAIN WITHOUT SCIATICA: Primary | ICD-10-CM

## 2024-10-18 DIAGNOSIS — G89.29 CHRONIC BILATERAL THORACIC BACK PAIN: ICD-10-CM

## 2024-10-18 DIAGNOSIS — M79.18 MYOFASCIAL PAIN: ICD-10-CM

## 2024-10-18 PROCEDURE — 98941 CHIROPRACT MANJ 3-4 REGIONS: CPT | Performed by: CHIROPRACTOR

## 2024-10-18 PROCEDURE — 99203 OFFICE O/P NEW LOW 30 MIN: CPT | Performed by: CHIROPRACTOR

## 2024-10-21 NOTE — PROGRESS NOTES
HPI:  Back Pain  This is a recurrent problem. The current episode started more than 1 year ago. The problem occurs intermittently. The problem has been waxing and waning since onset. The pain is present in the lumbar spine, sacro-iliac and thoracic spine. The quality of the pain is described as shooting, stabbing and aching. The pain does not radiate. The pain is at a severity of 5/10. The pain is moderate. The symptoms are aggravated by bending, position and twisting. She has tried heat, ice and home exercises for the symptoms. The treatment provided moderate relief.     Natalie Chao is a 34 y.o. female who presents for evaluation and possible treatment of intermittent ongoing neck upper back and lower back pain.  Neck and upper back pain is an intermittent throbbing and burning sensation atraumatic in nature described as a 5 on a pain scale.  Denies any significant radicular component.  Denies any numbness tingling or weakness.  Reports of significant headaches dizziness or cognitive change.    Low back pain is described as an intermittent discomfort without distal radiation denies any radicular symptoms.  Denies any numbness or tingling.  Denies any weaknesses.  Chief Complaint   Patient presents with    Neck - Pain     Neck pain that is dull with intermittent throbbing and burning pain. Pain score 5      Back Pain     Lower lumbar pain that is intermittent discomfort.  Pain score 1       Past Medical History:   Diagnosis Date    Abnormal Pap smear of cervix     Anemia     Anxiety     Asthma     COVID-19 virus detected 06/19/2020    Formatting of this note might be different from the original. 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 Tested positive on 6/16/2020.  Staff must wear proper PPE at pt vero'ts - mask, gown, gloves.   Last Assessment & Plan:  Formatting of this note might be different from the original. Tested positive 6/16/20  Denies symptoms Schedu    Depression     Gestational diabetes     IBS (irritable bowel syndrome)     PCOS (polycystic ovarian syndrome)       Past Surgical History:   Procedure Laterality Date     SECTION  2020    COLONOSCOPY      IL TONSILLECTOMY PRIMARY/SECONDARY AGE 11/> N/A 2023    Procedure: TONSILLECTOMY;  Surgeon: Josh Reddy MD;  Location: [facility];  Service: ENT    WISDOM TOOTH EXTRACTION       Family History   Problem Relation Age of Onset    No Known Problems Mother     No Known Problems Father     No Known Problems Brother     Diabetes Maternal Grandmother     No Known Problems Brother     Dementia Maternal Grandfather     Diabetes Paternal Grandmother     No Known Problems Paternal Grandfather     No Known Problems Son     No Known Problems Son     Colon cancer Neg Hx     Breast cancer Neg Hx     Ovarian cancer Neg Hx      Social History     Socioeconomic History    Marital status: Single     Spouse name: None    Number of children: None    Years of education: None    Highest education level: None   Occupational History    None   Tobacco Use    Smoking status: Never    Smokeless tobacco: Never   Vaping Use    Vaping status: Never Used   Substance and Sexual Activity    Alcohol use: Yes     Comment: Rare Socially    Drug use: Never    Sexual activity: Yes     Partners: Male     Birth control/protection: None   Other Topics Concern    None   Social History Narrative    None     Social Determinants of Health     Financial Resource Strain: Not on file   Food Insecurity: Not on file   Transportation Needs: Not on file   Physical Activity: Not on file   Stress: Not on file   Social Connections: Not on file   Intimate Partner Violence: Not on file   Housing Stability: Not on file       Current Outpatient Medications:     clindamycin (CLINDAGEL) 1 % gel, , Disp: , Rfl:     drospirenone-ethinyl estradiol (MARY) 3-0.03 MG per tablet, Take 1 tablet by mouth daily, Disp: 90 tablet,  Rfl: 3    hydrocortisone 2.5 % cream, Apply 1 Application topically if needed for irritation, Disp: , Rfl:     methocarbamol (ROBAXIN) 500 mg tablet, Take 1 tablet (500 mg total) by mouth daily at bedtime as needed for muscle spasms (Patient not taking: Reported on 4/8/2024), Disp: 30 tablet, Rfl: 0    ondansetron (ZOFRAN) 4 mg tablet, Take 1 tablet (4 mg total) by mouth every 8 (eight) hours as needed for nausea or vomiting, Disp: 20 tablet, Rfl: 0    spironolactone (ALDACTONE) 50 mg tablet, Take 50 mg by mouth every morning, Disp: , Rfl:     tirzepatide (Zepbound) 5 mg/0.5 mL auto-injector, Inject 0.5 mL (5 mg total) under the skin once a week, Disp: 2 mL, Rfl: 4    tretinoin (RETIN-A) 0.025 % cream, Apply 1 Application topically daily, Disp: , Rfl:   Allergies as of 10/18/2024    (No Known Allergies)         The following portions of the patient's history were reviewed and updated as appropriate: allergies, current medications, past family history, past medical history, past social history, past surgical history, and problem list.    Review of Systems   Constitutional: Negative.    Musculoskeletal:  Positive for back pain and myalgias.   Neurological: Negative.    Psychiatric/Behavioral: Negative.         Physical Exam:  Physical Exam  Vitals reviewed.   Constitutional:       Appearance: Normal appearance.   Cardiovascular:      Rate and Rhythm: Normal rate.      Pulses: Normal pulses.   Pulmonary:      Effort: Pulmonary effort is normal.   Musculoskeletal:      Cervical back: Spasms and tenderness present. Pain with movement present. Decreased range of motion.      Thoracic back: Spasms and tenderness present. Decreased range of motion.      Lumbar back: Spasms and tenderness present. Decreased range of motion. Negative right straight leg raise test and negative left straight leg raise test.        Back:    Skin:     General: Skin is warm and dry.   Neurological:      General: No focal deficit present.       Mental Status: She is alert and oriented to person, place, and time.      Sensory: Sensation is intact.      Motor: Motor function is intact.      Gait: Gait is intact.      Deep Tendon Reflexes: Reflexes are normal and symmetric.   Psychiatric:         Mood and Affect: Mood normal.         Behavior: Behavior normal.         Thought Content: Thought content normal.         Assessment:  There are no diagnoses linked to this encounter.     Treatment:  26805  Manipulation to the right innominate, sacrum, L5 levels via Freed drop maneuver T4 via gentle double thenar contact using a joint release well-tolerated    Discussion:  Reviewed past medical notes.  Patient presenting with neurologically stable mechanical spinal pain with myofascial overlay as.  Will have treatment over the next 4 weeks focusing on joint function attending the myofascial involvement reassessment 4 weeks.  No follow-ups on file.

## 2024-10-29 ENCOUNTER — PROCEDURE VISIT (OUTPATIENT)
Age: 34
End: 2024-10-29
Payer: COMMERCIAL

## 2024-10-29 VITALS
WEIGHT: 136 LBS | HEART RATE: 89 BPM | BODY MASS INDEX: 25.03 KG/M2 | SYSTOLIC BLOOD PRESSURE: 110 MMHG | DIASTOLIC BLOOD PRESSURE: 86 MMHG | HEIGHT: 62 IN

## 2024-10-29 DIAGNOSIS — G89.29 CHRONIC BILATERAL THORACIC BACK PAIN: ICD-10-CM

## 2024-10-29 DIAGNOSIS — M79.18 MYOFASCIAL PAIN: ICD-10-CM

## 2024-10-29 DIAGNOSIS — G89.29 CHRONIC BILATERAL LOW BACK PAIN WITHOUT SCIATICA: Primary | ICD-10-CM

## 2024-10-29 DIAGNOSIS — M54.6 CHRONIC BILATERAL THORACIC BACK PAIN: ICD-10-CM

## 2024-10-29 DIAGNOSIS — M54.50 CHRONIC BILATERAL LOW BACK PAIN WITHOUT SCIATICA: Primary | ICD-10-CM

## 2024-10-29 PROCEDURE — 97110 THERAPEUTIC EXERCISES: CPT | Performed by: CHIROPRACTOR

## 2024-10-29 PROCEDURE — 98941 CHIROPRACT MANJ 3-4 REGIONS: CPT | Performed by: CHIROPRACTOR

## 2024-10-29 NOTE — PROGRESS NOTES
HPI:  Natalie returns for treatment today low back is feeling better mid back very tight neck is stiff as well.      The following portions of the patient's history were reviewed and updated as appropriate: allergies, current medications, past family history, past medical history, past social history, past surgical history, and problem list.    Review of Systems    Physical Exam:  Exam reveals a pelvic obliquity elevated right first left nondominant leg with inequality biomechanical joint dysfunction present of the C5-C6 T4-T5-T6 T7 as well as L5-S1 and right SI.  Thoracolumbar myofascial involvement continues.    Assessment:   Diagnosis ICD-10-CM Associated Orders   1. Chronic bilateral low back pain without sciatica  M54.50     G89.29       2. Chronic bilateral thoracic back pain  M54.6     G89.29       3. Myofascial pain  M79.18                 Treatment: 40291  Manipulation to the right innominate, sacrum, L5 via drop maneuver well-tolerated.  Manipulation T6 T4 producing good joint release well-tolerated.    Therapeutic stretching bilateral shoulder girdle utilizing Graston technique.  I used GT 2, GT 3, and GT 4 instruments.  Mobilizations and stretching of the bilateral Scap thoracic junctions all well-tolerated.  12-minute procedure.    Discussion:  Continue home stretching we will see her back next week for follow-up.

## 2024-11-05 ENCOUNTER — PROCEDURE VISIT (OUTPATIENT)
Age: 34
End: 2024-11-05
Payer: COMMERCIAL

## 2024-11-05 VITALS
HEIGHT: 62 IN | BODY MASS INDEX: 25.03 KG/M2 | HEART RATE: 85 BPM | DIASTOLIC BLOOD PRESSURE: 75 MMHG | SYSTOLIC BLOOD PRESSURE: 112 MMHG | WEIGHT: 136 LBS

## 2024-11-05 DIAGNOSIS — G89.29 CHRONIC BILATERAL LOW BACK PAIN WITHOUT SCIATICA: Primary | ICD-10-CM

## 2024-11-05 DIAGNOSIS — M54.50 CHRONIC BILATERAL LOW BACK PAIN WITHOUT SCIATICA: Primary | ICD-10-CM

## 2024-11-05 DIAGNOSIS — G89.29 CHRONIC BILATERAL THORACIC BACK PAIN: ICD-10-CM

## 2024-11-05 DIAGNOSIS — M79.18 MYOFASCIAL PAIN: ICD-10-CM

## 2024-11-05 DIAGNOSIS — M54.6 CHRONIC BILATERAL THORACIC BACK PAIN: ICD-10-CM

## 2024-11-05 PROCEDURE — 97110 THERAPEUTIC EXERCISES: CPT | Performed by: CHIROPRACTOR

## 2024-11-05 PROCEDURE — 98941 CHIROPRACT MANJ 3-4 REGIONS: CPT | Performed by: CHIROPRACTOR

## 2024-11-05 NOTE — PROGRESS NOTES
HPI:  Natalie returns for treatment today low back is feeling better mid back very tight neck is stiff as well.  6 on a pain scale.      The following portions of the patient's history were reviewed and updated as appropriate: allergies, current medications, past family history, past medical history, past social history, past surgical history, and problem list.    Review of Systems    Physical Exam:  Exam reveals a pelvic obliquity elevated right first left nondominant leg with inequality biomechanical joint dysfunction present of the C5-C6 T4-T5-T6 T7 as well as L5-S1 and right SI.  Thoracolumbar myofascial involvement continues.    Assessment:   Diagnosis ICD-10-CM Associated Orders   1. Chronic bilateral low back pain without sciatica  M54.50     G89.29       2. Chronic bilateral thoracic back pain  M54.6     G89.29       3. Myofascial pain  M79.18                 Treatment: 16185  Manipulation to the right innominate, sacrum, L5 via drop maneuver well-tolerated.  Manipulation T6 T4 producing good joint release well-tolerated.    Therapeutic stretching bilateral shoulder girdle utilizing Graston technique.  I used GT 2, GT 3, and GT 4 instruments.  Mobilizations and stretching of the bilateral Scap thoracic junctions all well-tolerated.  12-minute procedure.    Discussion:  Continue home stretching we will see her back next week for follow-up.

## 2024-11-06 ENCOUNTER — OFFICE VISIT (OUTPATIENT)
Dept: ENDOCRINOLOGY | Facility: CLINIC | Age: 34
End: 2024-11-06
Payer: COMMERCIAL

## 2024-11-06 VITALS
BODY MASS INDEX: 24.4 KG/M2 | SYSTOLIC BLOOD PRESSURE: 120 MMHG | HEART RATE: 88 BPM | OXYGEN SATURATION: 99 % | DIASTOLIC BLOOD PRESSURE: 82 MMHG | HEIGHT: 62 IN | WEIGHT: 132.6 LBS

## 2024-11-06 DIAGNOSIS — E04.1 THYROID NODULE: ICD-10-CM

## 2024-11-06 DIAGNOSIS — E28.2 POLYCYSTIC OVARIAN SYNDROME: Primary | ICD-10-CM

## 2024-11-06 DIAGNOSIS — Z86.32 HISTORY OF GESTATIONAL DIABETES: ICD-10-CM

## 2024-11-06 DIAGNOSIS — E55.9 VITAMIN D DEFICIENCY: ICD-10-CM

## 2024-11-06 PROCEDURE — 99214 OFFICE O/P EST MOD 30 MIN: CPT | Performed by: PHYSICIAN ASSISTANT

## 2024-11-06 NOTE — PROGRESS NOTES
Patient Progress Note    CC: PCOS, thyroid nodule  Referring Provider  No referring provider defined for this encounter.     History of Present Illness:     Patient is a 34-year-old female here for follow-up of PCOS, thyroid nodule, weight gain, hirsutism.  She has a history of irregular periods.  She has had 2 pregnancies during which she did have gestational diabetes.  She is currently on an OCP.  Dermatology has started her on spironolactone for hirsutism and facial cysts.  She has noted improvement with the medication.  She is seeing weight management for weight loss.  She is taking Zepbound 5 mg weekly. She is trying to eat healthy and exercising 5 times a week.    She has a history of a thyroid nodule.  Last ultrasound was done in December 2022. No history of external radiation to head/neck/chest.  No family history of thyroid cancer.   Most recent thyroid ultrasound results: December 2022  FINDINGS:  Homogeneous smooth echotexture.     Right lobe: 4.5 x 1.1 x 1.8 cm. Volume 4.4 mL  Left lobe:  5.0 x 1.2 x 1.5 cm. Volume 4.3 mL  Isthmus: 0.1  cm.     Nodule #1.  Image 27.  Left mid/lower pole nodule measuring 0.8 x 0.4 x 0.5 cm.  Stable.   COMPOSITION:  2 points, solid or almost completely solid .  ECHOGENICITY:  2 points, hypoechoic.    SHAPE:  0 points, wider-than-tall.    MARGIN: 0 points, ill-defined.  ECHOGENIC FOCI:  0 points, none or large comet-tail artifacts.  TI-RADS Classification: TR 4 (4-6 points), FNA if > 1.5 cm. Follow if > 1cm.     IMPRESSION:     Stable thyroid nodule. No nodule meets current ACR criteria for requiring biopsy or followup ultrasounds.     Patient Active Problem List   Diagnosis    Abnormal Pap smear of cervix    Asthma    Carpal tunnel syndrome, bilateral    Chronic constipation    Irritable bowel syndrome with both constipation and diarrhea    Chronic fatigue    COVID-19    Depression    Female hirsutism    Oral herpes    Other alopecia    Polycystic ovarian syndrome     Situational anxiety    Vitamin D deficiency    Chronic bilateral low back pain without sciatica    Enlarged thyroid    Thyroid nodule    BMI 29.0-29.9,adult    Upper back pain on left side    Overweight    History of gestational diabetes     Past Medical History:   Diagnosis Date    Abnormal Pap smear of cervix     Anemia     Anxiety     Asthma     COVID-19 virus detected 2020    Formatting of this note might be different from the original. 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 Tested positive on 2020.  Staff must wear proper PPE at pt vero'ts - mask, gown, gloves.   Last Assessment & Plan:  Formatting of this note might be different from the original. Tested positive 20 Denies symptoms Schedu    Depression     Gestational diabetes     IBS (irritable bowel syndrome)     PCOS (polycystic ovarian syndrome)       Past Surgical History:   Procedure Laterality Date     SECTION  2020    COLONOSCOPY  2015    PA TONSILLECTOMY PRIMARY/SECONDARY AGE 11/> N/A 2023    Procedure: TONSILLECTOMY;  Surgeon: Josh Reddy MD;  Location: [facility];  Service: ENT    WISDOM TOOTH EXTRACTION        Family History   Problem Relation Age of Onset    No Known Problems Mother     No Known Problems Father     No Known Problems Brother     Diabetes Maternal Grandmother     No Known Problems Brother     Dementia Maternal Grandfather     Diabetes Paternal Grandmother     No Known Problems Paternal Grandfather     No Known Problems Son     No Known Problems Son     Colon cancer Neg Hx     Breast cancer Neg Hx     Ovarian cancer Neg Hx      Social History     Tobacco Use    Smoking status: Never    Smokeless tobacco: Never   Substance Use Topics    Alcohol use: Yes     Comment: Rare Socially     No Known Allergies  Current Outpatient Medications   Medication Sig Dispense Refill    clindamycin (CLINDAGEL) 1 % gel        "drospirenone-ethinyl estradiol (MARY) 3-0.03 MG per tablet Take 1 tablet by mouth daily 90 tablet 3    hydrocortisone 2.5 % cream Apply 1 Application topically if needed for irritation      ondansetron (ZOFRAN) 4 mg tablet Take 1 tablet (4 mg total) by mouth every 8 (eight) hours as needed for nausea or vomiting 20 tablet 0    spironolactone (ALDACTONE) 50 mg tablet Take 50 mg by mouth every morning      tirzepatide (Zepbound) 5 mg/0.5 mL auto-injector Inject 0.5 mL (5 mg total) under the skin once a week 2 mL 4    tretinoin (RETIN-A) 0.025 % cream Apply 1 Application topically daily      methocarbamol (ROBAXIN) 500 mg tablet Take 1 tablet (500 mg total) by mouth daily at bedtime as needed for muscle spasms (Patient not taking: Reported on 4/8/2024) 30 tablet 0     No current facility-administered medications for this visit.         Review of Systems   Constitutional:  Negative for activity change, appetite change, fatigue and unexpected weight change.   HENT:  Negative for trouble swallowing.    Eyes:  Negative for visual disturbance.   Respiratory:  Negative for shortness of breath.    Cardiovascular:  Negative for chest pain and palpitations.   Gastrointestinal:  Positive for constipation (occasional; IBS related). Negative for diarrhea.   Endocrine: Positive for cold intolerance. Negative for heat intolerance.   Musculoskeletal:  Positive for back pain (sees chiropractor).   Skin: Negative.    Neurological:  Negative for tremors.   Psychiatric/Behavioral: Negative.         Physical Exam:  Body mass index is 24.25 kg/m².  /82   Pulse 88   Ht 5' 2\" (1.575 m)   Wt 60.1 kg (132 lb 9.6 oz)   SpO2 99%   BMI 24.25 kg/m²    Wt Readings from Last 3 Encounters:   11/06/24 60.1 kg (132 lb 9.6 oz)   11/05/24 61.7 kg (136 lb)   10/29/24 61.7 kg (136 lb)       Physical Exam  Vitals and nursing note reviewed.   Constitutional:       Appearance: She is well-developed.   HENT:      Head: Normocephalic.   Eyes:      " General: No scleral icterus.  Neck:      Thyroid: No thyromegaly.   Cardiovascular:      Rate and Rhythm: Normal rate and regular rhythm.      Pulses:           Radial pulses are 2+ on the right side and 2+ on the left side.      Heart sounds: No murmur heard.  Pulmonary:      Effort: Pulmonary effort is normal. No respiratory distress.      Breath sounds: Normal breath sounds. No wheezing.   Musculoskeletal:      Cervical back: Neck supple.   Skin:     General: Skin is warm and dry.   Neurological:      Mental Status: She is alert.           Labs:   Lab Results   Component Value Date    HGBA1C 5.3 04/22/2022       Lab Results   Component Value Date    HDL 40 (L) 04/22/2022    TRIG 99 04/22/2022       Lab Results   Component Value Date    CALCIUM 9.2 09/13/2023    K 4.0 09/13/2023    CO2 26 09/13/2023     09/13/2023    BUN 11 09/13/2023    CREATININE 0.84 09/13/2023        eGFR   Date Value Ref Range Status   09/13/2023 90 ml/min/1.73sq m Final       Lab Results   Component Value Date    ALT 24 04/22/2022    AST 15 04/22/2022    GGT 28 04/22/2022    ALKPHOS 95 04/22/2022       Lab Results   Component Value Date    [id number] 3.282 12/30/2023    TSH 1.00 11/21/2020     Plan:    Diagnoses and all orders for this visit:    Polycystic ovarian syndrome  On OCP  Recommended healthy diet and routine exercise as tolerated  She is following with weight management for weight loss; on Zepbound  -     Hemoglobin A1C; Future    Thyroid nodule  Has a history of a stable left mid lower pole thyroid nodule  Check ultrasound  Check TFTs and thyroid antibodies  -     US thyroid; Future    Vitamin D deficiency  Vitamin D previously low at 25.9  Continue current supplementation  -     Vitamin D 25 hydroxy; Future    History of gestational diabetes  Check A1c  Recommended healthy diet and routine exercise as tolerated  She is seeing weight management for weight loss  -     Hemoglobin A1C; Future  -     Basic metabolic panel;  Future            Discussed with the patient and all questions fully answered. She will call me if any problems arise.    Counseled patient on diagnostic results, prognosis, risk and benefit of treatment options, instruction for management, importance of treatment compliance, risk  factor reduction and impressions      Naomi Mcdonald PA-C

## 2024-11-11 ENCOUNTER — PROCEDURE VISIT (OUTPATIENT)
Age: 34
End: 2024-11-11
Payer: COMMERCIAL

## 2024-11-11 VITALS
SYSTOLIC BLOOD PRESSURE: 99 MMHG | HEIGHT: 62 IN | DIASTOLIC BLOOD PRESSURE: 72 MMHG | BODY MASS INDEX: 24.29 KG/M2 | WEIGHT: 132 LBS | HEART RATE: 69 BPM

## 2024-11-11 DIAGNOSIS — M54.6 CHRONIC BILATERAL THORACIC BACK PAIN: ICD-10-CM

## 2024-11-11 DIAGNOSIS — G89.29 CHRONIC BILATERAL THORACIC BACK PAIN: ICD-10-CM

## 2024-11-11 DIAGNOSIS — G89.29 CHRONIC BILATERAL LOW BACK PAIN WITHOUT SCIATICA: Primary | ICD-10-CM

## 2024-11-11 DIAGNOSIS — M79.18 MYOFASCIAL PAIN: ICD-10-CM

## 2024-11-11 DIAGNOSIS — M54.50 CHRONIC BILATERAL LOW BACK PAIN WITHOUT SCIATICA: Primary | ICD-10-CM

## 2024-11-11 PROCEDURE — 97110 THERAPEUTIC EXERCISES: CPT | Performed by: CHIROPRACTOR

## 2024-11-11 PROCEDURE — 98941 CHIROPRACT MANJ 3-4 REGIONS: CPT | Performed by: CHIROPRACTOR

## 2024-11-11 NOTE — PROGRESS NOTES
HPI:  Natalie returns for treatment today low back is feeling better mid back very tight neck is stiff as well.  6 on a pain scale.      The following portions of the patient's history were reviewed and updated as appropriate: allergies, current medications, past family history, past medical history, past social history, past surgical history, and problem list.    Review of Systems    Physical Exam:  Exam reveals a pelvic obliquity elevated right first left nondominant leg with inequality biomechanical joint dysfunction present of the C5-C6 T4-T5-T6 T7 as well as L5-S1 and right SI.  Thoracolumbar myofascial involvement continues.    Assessment:   Diagnosis ICD-10-CM Associated Orders   1. Chronic bilateral low back pain without sciatica  M54.50     G89.29       2. Chronic bilateral thoracic back pain  M54.6     G89.29       3. Myofascial pain  M79.18                 Treatment: 75209  Manipulation to the right innominate, sacrum, L5 via drop maneuver well-tolerated.  Manipulation T6 T4 producing good joint release well-tolerated.    Therapeutic stretching bilateral shoulder girdle utilizing Graston technique.  I used GT 2, GT 3, and GT 4 instruments.  Mobilizations and stretching of the bilateral Scap thoracic junctions all well-tolerated.  12-minute procedure.    Discussion:  Continue home stretching we will see her back next week for follow-up.

## 2024-11-18 ENCOUNTER — TELEPHONE (OUTPATIENT)
Age: 34
End: 2024-11-18

## 2024-11-20 ENCOUNTER — OFFICE VISIT (OUTPATIENT)
Dept: INTERNAL MEDICINE CLINIC | Facility: OTHER | Age: 34
End: 2024-11-20
Payer: COMMERCIAL

## 2024-11-20 VITALS
OXYGEN SATURATION: 100 % | BODY MASS INDEX: 23.81 KG/M2 | DIASTOLIC BLOOD PRESSURE: 68 MMHG | HEART RATE: 84 BPM | HEIGHT: 62 IN | WEIGHT: 129.4 LBS | TEMPERATURE: 98 F | SYSTOLIC BLOOD PRESSURE: 100 MMHG | RESPIRATION RATE: 18 BRPM

## 2024-11-20 DIAGNOSIS — Z00.00 ANNUAL PHYSICAL EXAM: Primary | ICD-10-CM

## 2024-11-20 DIAGNOSIS — Z13.1 SCREENING FOR DIABETES MELLITUS (DM): ICD-10-CM

## 2024-11-20 DIAGNOSIS — M54.9 UPPER BACK PAIN ON LEFT SIDE: ICD-10-CM

## 2024-11-20 DIAGNOSIS — G89.29 CHRONIC BILATERAL LOW BACK PAIN WITHOUT SCIATICA: ICD-10-CM

## 2024-11-20 DIAGNOSIS — Z13.220 SCREENING FOR LIPID DISORDERS: ICD-10-CM

## 2024-11-20 DIAGNOSIS — M54.50 CHRONIC BILATERAL LOW BACK PAIN WITHOUT SCIATICA: ICD-10-CM

## 2024-11-20 DIAGNOSIS — Z13.0 SCREENING FOR DEFICIENCY ANEMIA: ICD-10-CM

## 2024-11-20 PROBLEM — U07.1 COVID-19: Status: RESOLVED | Noted: 2020-06-19 | Resolved: 2024-11-20

## 2024-11-20 PROBLEM — E66.3 OVERWEIGHT: Status: RESOLVED | Noted: 2024-02-08 | Resolved: 2024-11-20

## 2024-11-20 PROCEDURE — 99395 PREV VISIT EST AGE 18-39: CPT | Performed by: NURSE PRACTITIONER

## 2024-11-20 NOTE — ASSESSMENT & PLAN NOTE
Following with chiropractor  Follow up if symptoms persist, will recommend imaging and work up

## 2024-11-20 NOTE — ASSESSMENT & PLAN NOTE
Healthy 34 yr old female   Had influenza vaccine last week through [facility] for work  Tdap up to date   Pap up to date and normal 1/22/24, HPV neg   Continue healthy diet and routine exercise  Update labs as ordered  Follow up 1 year or sooner as needed

## 2024-11-20 NOTE — PROGRESS NOTES
Adult Annual Physical  Name: Natalie Chao      : 1990      MRN: [id number]  Encounter Provider: KYLIE Yuan  Encounter Date: 2024   Encounter department: [facility] [place]    Assessment & Plan  Annual physical exam  Healthy 34 yr old female   Had influenza vaccine last week through [facility] for work  Tdap up to date   Pap up to date and normal 24, HPV neg   Continue healthy diet and routine exercise  Update labs as ordered  Follow up 1 year or sooner as needed       Chronic bilateral low back pain without sciatica  Following with chiropractor  Follow up if symptoms persist, will recommend imaging and work up         Upper back pain on left side  Following with chiropractor  Follow up if symptoms persist, will recommend imaging and work up         Screening for deficiency anemia    Orders:    CBC and differential; Future    Screening for diabetes mellitus (DM)    Orders:    Comprehensive metabolic panel; Future    Screening for lipid disorders    Orders:    Lipid Panel with Direct LDL reflex; Future      Immunizations and preventive care screenings were discussed with patient today. Appropriate education was printed on patient's after visit summary.         History of Present Illness     Adult Annual Physical:  Patient presents for annual physical. Patient presents today for annual physical. She follows with bariatrics. She is on Zepbound 5mg daily and has achieved her goal weight. She continues on 5mg weekly. Tolerates well with occasional nausea. She is following with endocrine for her PCOS and thyroid nodule. .     Diet and Physical Activity:  - Diet/Nutrition: well balanced diet.  - Exercise: 3-4 times a week on average.    Depression Screening:    - PHQ-9 Score: 0    General Health:  - Sleep: sleeps well.  - Hearing: normal hearing bilateral ears.  - Vision: no vision problems.  - Dental: regular dental visits.    /GYN Health:  - Follows with  GYN: yes.   - Menopause: premenopausal.   - Contraception: oral contraceptives.      Review of Systems   Constitutional:  Negative for activity change, appetite change, chills, diaphoresis, fatigue, fever and unexpected weight change.   Eyes:  Negative for visual disturbance.   Respiratory:  Negative for cough, chest tightness, shortness of breath and wheezing.    Cardiovascular:  Negative for chest pain and palpitations.   Gastrointestinal:  Positive for nausea (secondary to Zepbound). Negative for abdominal distention, abdominal pain, blood in stool, constipation, diarrhea and vomiting.   Genitourinary:  Negative for difficulty urinating, dysuria, frequency, hematuria and urgency.   Musculoskeletal:  Positive for back pain (upper and lower back) and neck pain (pain at the base of her neck).   Neurological:  Positive for headaches (following with chiropractor). Negative for dizziness and light-headedness.   Psychiatric/Behavioral:  Negative for dysphoric mood and sleep disturbance. The patient is not nervous/anxious.      Medical History Reviewed by provider this encounter:     .  Past Medical History   Past Medical History:   Diagnosis Date    Abnormal Pap smear of cervix     Anemia     Anxiety     Asthma     COVID-19 virus detected 2020    Formatting of this note might be different from the original. 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 Tested positive on 2020.  Staff must wear proper PPE at pt vero'ts - mask, gown, gloves.   Last Assessment & Plan:  Formatting of this note might be different from the original. Tested positive 20 Denies symptoms [place]    Depression     Gestational diabetes     IBS (irritable bowel syndrome)     PCOS (polycystic ovarian syndrome)      Past Surgical History:   Procedure Laterality Date     SECTION  2020    COLONOSCOPY  2015    RI TONSILLECTOMY PRIMARY/SECONDARY AGE 11/> N/A  9/25/2023    Procedure: TONSILLECTOMY;  Surgeon: Josh Reddy MD;  Location: [facility];  Service: ENT    WISDOM TOOTH EXTRACTION       Family History   Problem Relation Age of Onset    No Known Problems Mother     No Known Problems Father     No Known Problems Brother     Diabetes Maternal Grandmother     No Known Problems Brother     Dementia Maternal Grandfather     Diabetes Paternal Grandmother     No Known Problems Paternal Grandfather     No Known Problems Son     No Known Problems Son     Colon cancer Neg Hx     Breast cancer Neg Hx     Ovarian cancer Neg Hx       reports that she has never smoked. She has never used smokeless tobacco. She reports current alcohol use. She reports that she does not use drugs.  Current Outpatient Medications on File Prior to Visit   Medication Sig Dispense Refill    clindamycin (CLINDAGEL) 1 % gel       drospirenone-ethinyl estradiol (MARY) 3-0.03 MG per tablet Take 1 tablet by mouth daily 90 tablet 3    hydrocortisone 2.5 % cream Apply 1 Application topically if needed for irritation      ondansetron (ZOFRAN) 4 mg tablet Take 1 tablet (4 mg total) by mouth every 8 (eight) hours as needed for nausea or vomiting 20 tablet 0    spironolactone (ALDACTONE) 50 mg tablet Take 50 mg by mouth every morning      tirzepatide (Zepbound) 5 mg/0.5 mL auto-injector Inject 0.5 mL (5 mg total) under the skin once a week 2 mL 4    tretinoin (RETIN-A) 0.025 % cream Apply 1 Application topically daily      [DISCONTINUED] methocarbamol (ROBAXIN) 500 mg tablet Take 1 tablet (500 mg total) by mouth daily at bedtime as needed for muscle spasms (Patient not taking: Reported on 4/8/2024) 30 tablet 0     No current facility-administered medications on file prior to visit.   No Known Allergies   Current Outpatient Medications on File Prior to Visit   Medication Sig Dispense Refill    clindamycin (CLINDAGEL) 1 % gel       drospirenone-ethinyl estradiol (MARY) 3-0.03 MG per tablet Take 1 tablet  "by mouth daily 90 tablet 3    hydrocortisone 2.5 % cream Apply 1 Application topically if needed for irritation      ondansetron (ZOFRAN) 4 mg tablet Take 1 tablet (4 mg total) by mouth every 8 (eight) hours as needed for nausea or vomiting 20 tablet 0    spironolactone (ALDACTONE) 50 mg tablet Take 50 mg by mouth every morning      tirzepatide (Zepbound) 5 mg/0.5 mL auto-injector Inject 0.5 mL (5 mg total) under the skin once a week 2 mL 4    tretinoin (RETIN-A) 0.025 % cream Apply 1 Application topically daily      [DISCONTINUED] methocarbamol (ROBAXIN) 500 mg tablet Take 1 tablet (500 mg total) by mouth daily at bedtime as needed for muscle spasms (Patient not taking: Reported on 4/8/2024) 30 tablet 0     No current facility-administered medications on file prior to visit.      Social History     Tobacco Use    Smoking status: Never    Smokeless tobacco: Never   Vaping Use    Vaping status: Never Used   Substance and Sexual Activity    Alcohol use: Yes     Comment: Rare Socially    Drug use: Never    Sexual activity: Yes     Partners: Male     Birth control/protection: None       Objective   /68 (BP Location: Left arm, Patient Position: Sitting, Cuff Size: Standard)   Pulse 84   Temp 98 °F (36.7 °C) (Temporal)   Resp 18   Ht 5' 2\" (1.575 m)   Wt 58.7 kg (129 lb 6.4 oz)   SpO2 100%   BMI 23.67 kg/m²     Physical Exam  Vitals reviewed.   Constitutional:       General: She is not in acute distress.     Appearance: Normal appearance. She is well-developed and normal weight. She is not diaphoretic.   HENT:      Head: Normocephalic and atraumatic.      Right Ear: Tympanic membrane and external ear normal.      Left Ear: Tympanic membrane and external ear normal.      Nose: Nose normal. No mucosal edema, congestion or rhinorrhea.      Mouth/Throat:      Mouth: Mucous membranes are moist.      Pharynx: Oropharynx is clear. No oropharyngeal exudate or posterior oropharyngeal erythema.   Eyes:      " Conjunctiva/sclera: Conjunctivae normal.      Pupils: Pupils are equal, round, and reactive to light.   Neck:      Thyroid: No thyromegaly.   Cardiovascular:      Rate and Rhythm: Normal rate and regular rhythm.      Heart sounds: Normal heart sounds. No murmur heard.  Pulmonary:      Effort: Pulmonary effort is normal. No respiratory distress.      Breath sounds: Normal breath sounds. No decreased breath sounds, wheezing, rhonchi or rales.   Abdominal:      General: Abdomen is flat. Bowel sounds are normal. There is no distension.      Palpations: Abdomen is soft. There is no mass.      Tenderness: There is no abdominal tenderness. There is no guarding.   Musculoskeletal:         General: No tenderness. Normal range of motion.      Cervical back: Normal range of motion and neck supple. No edema.      Right lower leg: No edema.      Left lower leg: No edema.   Lymphadenopathy:      Cervical: No cervical adenopathy.      Upper Body:      Right upper body: No supraclavicular, axillary, pectoral or epitrochlear adenopathy.      Left upper body: No supraclavicular, axillary, pectoral or epitrochlear adenopathy.   Skin:     General: Skin is warm.      Findings: No rash.   Neurological:      Mental Status: She is alert and oriented to person, place, and time. Mental status is at baseline.      Motor: No weakness or abnormal muscle tone.      Gait: Gait normal.      Deep Tendon Reflexes: Reflexes are normal and symmetric.   Psychiatric:         Mood and Affect: Mood normal.         Behavior: Behavior normal.         Thought Content: Thought content normal.         Judgment: Judgment normal.

## 2024-12-03 ENCOUNTER — PROCEDURE VISIT (OUTPATIENT)
Age: 34
End: 2024-12-03
Payer: COMMERCIAL

## 2024-12-03 VITALS
HEIGHT: 62 IN | WEIGHT: 129 LBS | HEART RATE: 78 BPM | SYSTOLIC BLOOD PRESSURE: 98 MMHG | DIASTOLIC BLOOD PRESSURE: 75 MMHG | BODY MASS INDEX: 23.74 KG/M2

## 2024-12-03 DIAGNOSIS — M54.6 CHRONIC BILATERAL THORACIC BACK PAIN: ICD-10-CM

## 2024-12-03 DIAGNOSIS — G89.29 CHRONIC BILATERAL LOW BACK PAIN WITHOUT SCIATICA: Primary | ICD-10-CM

## 2024-12-03 DIAGNOSIS — M79.18 MYOFASCIAL PAIN: ICD-10-CM

## 2024-12-03 DIAGNOSIS — G89.29 CHRONIC BILATERAL THORACIC BACK PAIN: ICD-10-CM

## 2024-12-03 DIAGNOSIS — M54.50 CHRONIC BILATERAL LOW BACK PAIN WITHOUT SCIATICA: Primary | ICD-10-CM

## 2024-12-03 PROCEDURE — 98941 CHIROPRACT MANJ 3-4 REGIONS: CPT | Performed by: CHIROPRACTOR

## 2024-12-03 PROCEDURE — 97110 THERAPEUTIC EXERCISES: CPT | Performed by: CHIROPRACTOR

## 2024-12-05 NOTE — PROGRESS NOTES
HPI:  Natalie returns for treatment today low back is feeling better mid back very tight neck is stiff as well.  5-6 on a pain scale.      The following portions of the patient's history were reviewed and updated as appropriate: allergies, current medications, past family history, past medical history, past social history, past surgical history, and problem list.    Review of Systems    Physical Exam:  Exam reveals a pelvic obliquity elevated right first left nondominant leg with inequality biomechanical joint dysfunction present of the C5-C6 T4-T5-T6 T7 as well as L5-S1 and right SI.  Thoracolumbar myofascial involvement continues.    Assessment:   Diagnosis ICD-10-CM Associated Orders   1. Chronic bilateral low back pain without sciatica  M54.50     G89.29       2. Chronic bilateral thoracic back pain  M54.6     G89.29       3. Myofascial pain  M79.18                 Treatment: 37085  Manipulation to the right innominate, sacrum, L5 via drop maneuver well-tolerated.  Manipulation T6 T4 producing good joint release well-tolerated.    Therapeutic stretching bilateral shoulder girdle utilizing Graston technique.  I used GT 2, GT 3, and GT 4 instruments.  Mobilizations and stretching of the bilateral Scap thoracic junctions all well-tolerated.  12-minute procedure.    Discussion:  Continue home stretching we will see her back next week for follow-up.

## 2024-12-17 ENCOUNTER — PROCEDURE VISIT (OUTPATIENT)
Age: 34
End: 2024-12-17
Payer: COMMERCIAL

## 2024-12-17 VITALS
HEART RATE: 76 BPM | DIASTOLIC BLOOD PRESSURE: 78 MMHG | BODY MASS INDEX: 23.74 KG/M2 | HEIGHT: 62 IN | SYSTOLIC BLOOD PRESSURE: 107 MMHG | WEIGHT: 129 LBS

## 2024-12-17 DIAGNOSIS — G89.29 CHRONIC BILATERAL LOW BACK PAIN WITHOUT SCIATICA: Primary | ICD-10-CM

## 2024-12-17 DIAGNOSIS — M54.50 CHRONIC BILATERAL LOW BACK PAIN WITHOUT SCIATICA: Primary | ICD-10-CM

## 2024-12-17 DIAGNOSIS — G89.29 CHRONIC BILATERAL THORACIC BACK PAIN: ICD-10-CM

## 2024-12-17 DIAGNOSIS — M79.18 MYOFASCIAL PAIN: ICD-10-CM

## 2024-12-17 DIAGNOSIS — M54.6 CHRONIC BILATERAL THORACIC BACK PAIN: ICD-10-CM

## 2024-12-17 PROCEDURE — 97110 THERAPEUTIC EXERCISES: CPT | Performed by: CHIROPRACTOR

## 2024-12-17 PROCEDURE — 98941 CHIROPRACT MANJ 3-4 REGIONS: CPT | Performed by: CHIROPRACTOR

## 2024-12-17 NOTE — PROGRESS NOTES
HPI:  Natalie returns for treatment today low back is feeling better 0 on a pain scale.  Neck and shoulders tight stiff and sore 6 on a pain scale.    The following portions of the patient's history were reviewed and updated as appropriate: allergies, current medications, past family history, past medical history, past social history, past surgical history, and problem list.    Review of Systems    Physical Exam:  Exam reveals a pelvic obliquity elevated right first left nondominant leg with inequality biomechanical joint dysfunction present of the C5-C6 T4-T5-T6 T7 as well as L5-S1 and right SI.  Thoracolumbar myofascial involvement continues.    Assessment:   Diagnosis ICD-10-CM Associated Orders   1. Chronic bilateral low back pain without sciatica  M54.50     G89.29       2. Chronic bilateral thoracic back pain  M54.6     G89.29       3. Myofascial pain  M79.18                   Treatment: 74284  Manipulation to the right innominate, sacrum, L5 via drop maneuver well-tolerated.  Manipulation T6 T4 producing good joint release well-tolerated.    Therapeutic stretching bilateral shoulder girdle utilizing Graston technique.  I used GT 2, GT 3, and GT 4 instruments.  Mobilizations and stretching of the bilateral Scap thoracic junctions all well-tolerated.  12-minute procedure.    Discussion:  Continue home stretching we will see her back next week for follow-up.

## 2024-12-27 DIAGNOSIS — E66.3 OVERWEIGHT: ICD-10-CM

## 2024-12-27 RX ORDER — TIRZEPATIDE 5 MG/.5ML
5 INJECTION, SOLUTION SUBCUTANEOUS WEEKLY
Qty: 2 ML | Refills: 0 | Status: SHIPPED | OUTPATIENT
Start: 2024-12-27

## 2024-12-27 NOTE — PROGRESS NOTES
Assessment & Plan  hx of Obesity, Class I, BMI 30-34.9    Continue with Zepbound 5 mg    Initial weight: 164  Current weight: 140  TBW loss%: 14      Recommend practicing mindful eating and drinking with frequent meals/snacks as it can potentially improve metabolism and allow for better portion control by decreasing Ghrelin (hunger hormone).      Recommend to time your carb consumption: Minimizing meals high in complex carbs close to bedtime, as they can interfere with sleep as well as potentially negatively impact your metabolism and promote weight gain. Consistently with protein intake throughout the day can help with satiety, muscle repair/growth    Recommend adequate hydration to at least half your body weight in ounces unless medically contraindicated (ie. Systolic heart failure) to help control cravings as well as nutritional support. Fluid requirements will need to be increased as it is important to consider GI losses, physical activity and medication induced fluid loss if applicable. Incorporating resistance training and/or strength training is necessary to help improve metabolic rate if able to tolerate.               Return in about 12 weeks (around 3/24/2025) for followup .     Total time spent reviewing chart, interviewing patient, examining patient, discussing plan, answering all questions, and documentin min. Most recent notes and records were reviewed.       ______________________________________________________________________        Subjective:     Chief Complaint   Patient presents with    Follow-up     MWM F/u; Waist 32in       HPI: Natalie Chao  is a 34 y.o. female with past medical history of presents to the clinic for follow-up.    Prior weight loss attempts: Previously was on Topamax, Saxenda and Wegovy but did not tolerate it well.  Doing well with Zepbound but was out of the medication for couple weeks. Just restarted   Current Medication: zepbound 5mg   Medication Side effects:   "none currently     Weight loss plan:  Conservative Program.       Dietary regimen: Breakfast-banana, egg, lunch- leftovers, chicken breast salmon, pork chops, rice, vegetables   Fluid intake: Water, 4-5 bottles (gallons)   Exercise: five times a week   Occupational: Psychiatry    Review Of Systems:  Constitutional:  Negative for diaphoresis.   Gastrointestinal:  Negative for abdominal pain, and vomiting.   Skin:  Negative for pallor.   Psychiatric/Behavioral:  Negative for behavioral problems, confusion, dysphoric mood and hallucinations.    All other systems reviewed and are negative.     Objective:  /72 (BP Location: Left arm, Patient Position: Sitting)   Pulse 78   Resp 16   Ht 5' 2\" (1.575 m)   Wt 63.7 kg (140 lb 6.4 oz)   BMI 25.68 kg/m²     Wt Readings from Last 10 Encounters:   12/30/24 63.7 kg (140 lb 6.4 oz)   12/17/24 58.5 kg (129 lb)   12/03/24 58.5 kg (129 lb)   11/20/24 58.7 kg (129 lb 6.4 oz)   11/11/24 59.9 kg (132 lb)   11/06/24 60.1 kg (132 lb 9.6 oz)   11/05/24 61.7 kg (136 lb)   10/29/24 61.7 kg (136 lb)   10/18/24 61.7 kg (136 lb)   08/14/24 61.9 kg (136 lb 6.4 oz)       Physical Exam  Constitutional:       General: No acute distress.  Well-nourished  HENT:      Head: Normocephalic and atraumatic.   Eyes:      Extraocular Movements: Extraocular movements intact.      Conjunctiva/sclera: Conjunctivae normal.      Pupils: Pupils are equal, round, and reactive to light.   Cardiovascular:      Rate and Rhythm: Normal rate.   Pulmonary:      Effort: Pulmonary effort is normal.   Neurological:      General: No focal deficit present.  AO x 3     Mental Status: Alert and oriented to person, place, and time. Mental status is at baseline.   Psychiatric:         Mood and Affect: Mood normal.         Behavior: Behavior normal.     Labs and Imaging  Recent labs and imaging have been personally reviewed.    Lab Results   Component Value Date    WBC 8.50 09/13/2023    HGB 13.8 09/13/2023    HCT 42.2 " 09/13/2023    MCV 89 09/13/2023     09/13/2023       Lab Results   Component Value Date    SODIUM 135 09/13/2023    K 4.0 09/13/2023     09/13/2023    CO2 26 09/13/2023    AGAP 6 09/13/2023    BUN 11 09/13/2023    CREATININE 0.84 09/13/2023    GLUC 77 09/13/2023    GLUF 94 04/22/2022    CALCIUM 9.2 09/13/2023    AST 15 04/22/2022    ALT 24 04/22/2022    ALKPHOS 95 04/22/2022    TP 7.6 04/22/2022    TBILI 0.81 04/22/2022    EGFR 90 09/13/2023       Lab Results   Component Value Date    HGBA1C 5.3 04/22/2022       Lab Results   Component Value Date    JTT0AYDIELEC 3.282 12/30/2023    TSH 1.00 11/21/2020       Lab Results   Component Value Date    CHOLESTEROL 136 04/22/2022       Lab Results   Component Value Date    HDL 40 (L) 04/22/2022       Lab Results   Component Value Date    TRIG 99 04/22/2022       Lab Results   Component Value Date    LDLCALC 76 04/22/2022

## 2024-12-30 ENCOUNTER — OFFICE VISIT (OUTPATIENT)
Dept: BARIATRICS | Facility: CLINIC | Age: 34
End: 2024-12-30
Payer: COMMERCIAL

## 2024-12-30 VITALS
HEIGHT: 62 IN | DIASTOLIC BLOOD PRESSURE: 72 MMHG | RESPIRATION RATE: 16 BRPM | WEIGHT: 140.4 LBS | BODY MASS INDEX: 25.83 KG/M2 | HEART RATE: 78 BPM | SYSTOLIC BLOOD PRESSURE: 110 MMHG

## 2024-12-30 DIAGNOSIS — E66.3 OVERWEIGHT: ICD-10-CM

## 2024-12-30 DIAGNOSIS — E66.811 OBESITY, CLASS I, BMI 30-34.9: Primary | ICD-10-CM

## 2024-12-30 PROCEDURE — 99213 OFFICE O/P EST LOW 20 MIN: CPT | Performed by: STUDENT IN AN ORGANIZED HEALTH CARE EDUCATION/TRAINING PROGRAM

## 2024-12-30 RX ORDER — TIRZEPATIDE 5 MG/.5ML
5 INJECTION, SOLUTION SUBCUTANEOUS WEEKLY
Qty: 2 ML | Refills: 2 | Status: SHIPPED | OUTPATIENT
Start: 2025-01-24

## 2025-01-02 ENCOUNTER — HOSPITAL ENCOUNTER (OUTPATIENT)
Dept: RADIOLOGY | Facility: IMAGING CENTER | Age: 35
End: 2025-01-02
Payer: COMMERCIAL

## 2025-01-02 DIAGNOSIS — E04.1 THYROID NODULE: ICD-10-CM

## 2025-01-02 PROCEDURE — 76536 US EXAM OF HEAD AND NECK: CPT

## 2025-01-10 ENCOUNTER — APPOINTMENT (OUTPATIENT)
Dept: LAB | Facility: IMAGING CENTER | Age: 35
End: 2025-01-10
Payer: COMMERCIAL

## 2025-01-10 ENCOUNTER — RESULTS FOLLOW-UP (OUTPATIENT)
Dept: INTERNAL MEDICINE CLINIC | Facility: OTHER | Age: 35
End: 2025-01-10

## 2025-01-10 ENCOUNTER — RESULTS FOLLOW-UP (OUTPATIENT)
Dept: ENDOCRINOLOGY | Facility: CLINIC | Age: 35
End: 2025-01-10

## 2025-01-10 DIAGNOSIS — Z13.1 SCREENING FOR DIABETES MELLITUS (DM): ICD-10-CM

## 2025-01-10 DIAGNOSIS — E28.2 POLYCYSTIC OVARIAN SYNDROME: ICD-10-CM

## 2025-01-10 DIAGNOSIS — L65.9 HAIR LOSS: ICD-10-CM

## 2025-01-10 DIAGNOSIS — E04.1 THYROID NODULE: ICD-10-CM

## 2025-01-10 DIAGNOSIS — R17 ELEVATED BILIRUBIN: Primary | ICD-10-CM

## 2025-01-10 DIAGNOSIS — Z13.220 SCREENING FOR LIPID DISORDERS: ICD-10-CM

## 2025-01-10 DIAGNOSIS — E55.9 VITAMIN D DEFICIENCY: ICD-10-CM

## 2025-01-10 DIAGNOSIS — Z13.0 SCREENING FOR DEFICIENCY ANEMIA: ICD-10-CM

## 2025-01-10 DIAGNOSIS — Z86.32 H/O GESTATIONAL DIABETES MELLITUS, NOT CURRENTLY PREGNANT: ICD-10-CM

## 2025-01-10 LAB
25(OH)D3 SERPL-MCNC: 31.6 NG/ML (ref 30–100)
ALBUMIN SERPL BCG-MCNC: 4.7 G/DL (ref 3.5–5)
ALP SERPL-CCNC: 69 U/L (ref 34–104)
ALT SERPL W P-5'-P-CCNC: 10 U/L (ref 7–52)
ANION GAP SERPL CALCULATED.3IONS-SCNC: 7 MMOL/L (ref 4–13)
AST SERPL W P-5'-P-CCNC: 15 U/L (ref 13–39)
BASOPHILS # BLD AUTO: 0.04 THOUSANDS/ΜL (ref 0–0.1)
BASOPHILS NFR BLD AUTO: 1 % (ref 0–1)
BILIRUB SERPL-MCNC: 1.59 MG/DL (ref 0.2–1)
BUN SERPL-MCNC: 13 MG/DL (ref 5–25)
CALCIUM SERPL-MCNC: 9.8 MG/DL (ref 8.4–10.2)
CHLORIDE SERPL-SCNC: 100 MMOL/L (ref 96–108)
CHOLEST SERPL-MCNC: 149 MG/DL (ref ?–200)
CO2 SERPL-SCNC: 28 MMOL/L (ref 21–32)
CREAT SERPL-MCNC: 0.93 MG/DL (ref 0.6–1.3)
EOSINOPHIL # BLD AUTO: 0.08 THOUSAND/ΜL (ref 0–0.61)
EOSINOPHIL NFR BLD AUTO: 1 % (ref 0–6)
ERYTHROCYTE [DISTWIDTH] IN BLOOD BY AUTOMATED COUNT: 12.5 % (ref 11.6–15.1)
EST. AVERAGE GLUCOSE BLD GHB EST-MCNC: 97 MG/DL
GFR SERPL CREATININE-BSD FRML MDRD: 79 ML/MIN/1.73SQ M
GLUCOSE P FAST SERPL-MCNC: 71 MG/DL (ref 65–99)
HBA1C MFR BLD: 5 %
HCT VFR BLD AUTO: 41.8 % (ref 34.8–46.1)
HDLC SERPL-MCNC: 51 MG/DL
HGB BLD-MCNC: 13.8 G/DL (ref 11.5–15.4)
IMM GRANULOCYTES # BLD AUTO: 0.01 THOUSAND/UL (ref 0–0.2)
IMM GRANULOCYTES NFR BLD AUTO: 0 % (ref 0–2)
LDLC SERPL CALC-MCNC: 77 MG/DL (ref 0–100)
LYMPHOCYTES # BLD AUTO: 2.24 THOUSANDS/ΜL (ref 0.6–4.47)
LYMPHOCYTES NFR BLD AUTO: 33 % (ref 14–44)
MCH RBC QN AUTO: 30 PG (ref 26.8–34.3)
MCHC RBC AUTO-ENTMCNC: 33 G/DL (ref 31.4–37.4)
MCV RBC AUTO: 91 FL (ref 82–98)
MONOCYTES # BLD AUTO: 0.37 THOUSAND/ΜL (ref 0.17–1.22)
MONOCYTES NFR BLD AUTO: 5 % (ref 4–12)
NEUTROPHILS # BLD AUTO: 4.13 THOUSANDS/ΜL (ref 1.85–7.62)
NEUTS SEG NFR BLD AUTO: 60 % (ref 43–75)
NRBC BLD AUTO-RTO: 0 /100 WBCS
PLATELET # BLD AUTO: 307 THOUSANDS/UL (ref 149–390)
PMV BLD AUTO: 11 FL (ref 8.9–12.7)
POTASSIUM SERPL-SCNC: 4.4 MMOL/L (ref 3.5–5.3)
PROT SERPL-MCNC: 7.8 G/DL (ref 6.4–8.4)
RBC # BLD AUTO: 4.6 MILLION/UL (ref 3.81–5.12)
SODIUM SERPL-SCNC: 135 MMOL/L (ref 135–147)
T4 FREE SERPL-MCNC: 0.87 NG/DL (ref 0.61–1.12)
TRIGL SERPL-MCNC: 106 MG/DL (ref ?–150)
TSH SERPL DL<=0.05 MIU/L-ACNC: 1.99 UIU/ML (ref 0.45–4.5)
VIT B12 SERPL-MCNC: 254 PG/ML (ref 180–914)
WBC # BLD AUTO: 6.87 THOUSAND/UL (ref 4.31–10.16)

## 2025-01-10 PROCEDURE — 80061 LIPID PANEL: CPT

## 2025-01-10 PROCEDURE — 85025 COMPLETE CBC W/AUTO DIFF WBC: CPT

## 2025-01-10 PROCEDURE — 86800 THYROGLOBULIN ANTIBODY: CPT

## 2025-01-10 PROCEDURE — 84443 ASSAY THYROID STIM HORMONE: CPT

## 2025-01-10 PROCEDURE — 36415 COLL VENOUS BLD VENIPUNCTURE: CPT

## 2025-01-10 PROCEDURE — 82607 VITAMIN B-12: CPT

## 2025-01-10 PROCEDURE — 86376 MICROSOMAL ANTIBODY EACH: CPT

## 2025-01-10 PROCEDURE — 80053 COMPREHEN METABOLIC PANEL: CPT

## 2025-01-10 PROCEDURE — 84439 ASSAY OF FREE THYROXINE: CPT

## 2025-01-10 PROCEDURE — 83036 HEMOGLOBIN GLYCOSYLATED A1C: CPT

## 2025-01-10 PROCEDURE — 82306 VITAMIN D 25 HYDROXY: CPT

## 2025-01-10 NOTE — RESULT ENCOUNTER NOTE
Hi  Your thyroid blood work is within normal range, vitamin D is normal, A1c is normal  Vitamin  B12 is low, please take vitamin B12 supplementation 100 mcg daily    Tate Davis

## 2025-01-11 LAB
THYROGLOB AB SERPL-ACNC: 15.3 IU/ML (ref 0–0.9)
THYROPEROXIDASE AB SERPL-ACNC: 11 IU/ML (ref 0–34)

## 2025-01-13 ENCOUNTER — RESULTS FOLLOW-UP (OUTPATIENT)
Dept: ENDOCRINOLOGY | Facility: CLINIC | Age: 35
End: 2025-01-13

## 2025-01-13 NOTE — RESULT ENCOUNTER NOTE
Edwin Chao   Your thyroglobulin antibodies positive, you will need monitoring of thyroid function test once a year there is risk of hypothyroidism/underactive thyroid in future  Please let me know if you have any questions.    Tate Davis

## 2025-02-18 DIAGNOSIS — E66.811 OBESITY, CLASS I, BMI 30-34.9: ICD-10-CM

## 2025-02-18 DIAGNOSIS — E66.3 OVERWEIGHT: ICD-10-CM

## 2025-02-19 RX ORDER — TIRZEPATIDE 5 MG/.5ML
5 INJECTION, SOLUTION SUBCUTANEOUS WEEKLY
Qty: 6 ML | Refills: 0 | Status: SHIPPED | OUTPATIENT
Start: 2025-02-19

## 2025-03-12 PROBLEM — Z00.00 ANNUAL PHYSICAL EXAM: Status: RESOLVED | Noted: 2022-11-02 | Resolved: 2025-03-12

## 2025-03-12 NOTE — PROGRESS NOTES
Name: Natalie Chao      : 1990      MRN: [id number]  Encounter Provider: KYLIE Mckeon  Encounter Date: 3/14/2025   [facility]  :  Assessment & Plan  Well woman exam with routine gynecological exam   All questions answered.  Breast self exam technique reviewed and patient encouraged to perform self-exam monthly.  Contraception: OCP (estrogen/progesterone).  Diagnosis explained in detail, including differential.  Dietary diary.  Discussed healthy lifestyle modifications.  Educational material distributed.  Follow up in 1 year.  Follow up as needed.  Breast awareness reviewed  Considering TTC-reviewed recommendation to start folic acid and prenatal vitamin approximately 3 months prior to trying to conceive.  Encouraged healthy diet, exercise and lifestyle  Encouraged follow-up with PCP as needed  Written information provided about Gardasil vaccine series       Polycystic ovarian syndrome  Written information provided       Will call/[employer] message with results  RTO 1 year for annual exam     History of Present Illness   HPI  Natalie Chao is a 35 y.o. female who presents for annual well woman exam.  Last Pap smear 24 NILM/ HR HPV(-).  Periods are regular every 28-30 days, lasting 4-5 days. No intermenstrual bleeding, spotting, or discharge.    Current contraception: OCP (estrogen/progesterone)  History of abnormal Pap smear: yes -   Family history of uterine or ovarian cancer: no  Regular self breast exam: no  History of abnormal mammogram: To begin at age 39 unless otherwise clinically indicated  Family history of breast cancer: no  History of abnormal lipids: no  Gardasil vaccine: unsure       History obtained from: patient    Review of Systems   Constitutional:  Negative for chills and fever.   Respiratory: Negative.     Cardiovascular: Negative.    Genitourinary: Negative.      Medical History Reviewed by provider this  encounter:  Tobacco  Allergies  Meds  Problems  Med Hx  Surg Hx  Fam Hx  Soc   Hx    .     Objective   There were no vitals taken for this visit.     Physical Exam  General:   alert and oriented, in no acute distress, alert, appears stated age, and cooperative   Heart: regular rate and rhythm, S1, S2 normal, no murmur, click, rub or gallop   Lungs: clear to auscultation bilaterally   Abdomen: soft, non-tender, without masses or organomegaly   Vulva: normal, Bartholin's, Urethra, Cleaton's normal   Vagina: normal mucosa, normal discharge, no palpable nodules   Cervix: no cervical motion tenderness and no lesions   Uterus: normal size, non-tender, normal shape and consistency   Adnexa: normal adnexa and no mass, fullness, tenderness   Breast: breasts appear normal, no suspicious masses, no skin or nipple changes or axillary nodes.        Menstrual History:  OB History          2    Para   2    Term   2            AB        Living   2         SAB        IAB        Ectopic        Multiple        Live Births   2           Obstetric Comments   Menarche around 7-9 y/o               Menarche age: 7-8  No LMP recorded.

## 2025-03-14 ENCOUNTER — ANNUAL EXAM (OUTPATIENT)
Dept: OBGYN CLINIC | Facility: MEDICAL CENTER | Age: 35
End: 2025-03-14
Payer: COMMERCIAL

## 2025-03-14 VITALS
WEIGHT: 133.9 LBS | SYSTOLIC BLOOD PRESSURE: 110 MMHG | BODY MASS INDEX: 24.64 KG/M2 | HEIGHT: 62 IN | DIASTOLIC BLOOD PRESSURE: 84 MMHG

## 2025-03-14 DIAGNOSIS — Z01.419 WELL WOMAN EXAM WITH ROUTINE GYNECOLOGICAL EXAM: Primary | ICD-10-CM

## 2025-03-14 DIAGNOSIS — E28.2 POLYCYSTIC OVARIAN SYNDROME: ICD-10-CM

## 2025-03-14 PROBLEM — E04.9 ENLARGED THYROID: Status: RESOLVED | Noted: 2021-08-06 | Resolved: 2025-03-14

## 2025-03-14 PROBLEM — M54.9 UPPER BACK PAIN ON LEFT SIDE: Status: RESOLVED | Noted: 2023-11-16 | Resolved: 2025-03-14

## 2025-03-14 PROBLEM — Z86.32 HISTORY OF GESTATIONAL DIABETES: Status: RESOLVED | Noted: 2024-11-06 | Resolved: 2025-03-14

## 2025-03-14 PROCEDURE — S0612 ANNUAL GYNECOLOGICAL EXAMINA: HCPCS | Performed by: NURSE PRACTITIONER

## 2025-03-14 RX ORDER — DIPHENOXYLATE HYDROCHLORIDE AND ATROPINE SULFATE 2.5; .025 MG/1; MG/1
1 TABLET ORAL DAILY
COMMUNITY

## 2025-03-14 RX ORDER — DROSPIRENONE AND ETHINYL ESTRADIOL 0.03MG-3MG
1 KIT ORAL DAILY
Qty: 90 TABLET | Refills: 3 | Status: SHIPPED | OUTPATIENT
Start: 2025-03-14 | End: 2025-03-24

## 2025-03-23 NOTE — PROGRESS NOTES
Assessment & Plan  BMI 24    Initial weight: 164  Previous weight: 140  Current weight:130    TBW loss%: 26    Medication: Continue Zepbound 5     Dietary modifications: Recommended to increase fluids and macronutrient intake to accommodate for strength training       Dietary Recommendations:  Recommend to avoid skipping any meals. Adequate amount of Macronutrients (minimal 3 meals a day) is necessary to help improve metabolism, satiety and allow for better portion control by decreasing Ghrelin (hunger hormone). Lack of hunger can be suppressed by a hormone called Leptin (full hormone) which can occur from a previous meal or caffeine intake    Protein intake throughout the day can help promote satiety and is necessary for muscle growth/repair    Carbohydrates are essential as it is the vital source of fuel for daily activities. energy, cell function, nutrient absorption, and hormone production.     Fats: Essential vitamins like A, D, and E, support cell growth, function and are necessary for nutrient absorption to support your organs     Fluid intake which is at least half your body weight in ounces is necessary to help control cravings (decreasing confusion for appetite vs water deprivation) as the human body is made up of 50-70% of Fluids. If there is a diversion for water alone, would recommend flavored water (example-splash of lemonade or ice tea) to help promote compliance. Fluids include Teas, water, flavored water, seltzer water, coffee, shakes    Metabolism:    Metabolism can also be promoted by macronutrient intake and increased muscle weight via thermogenesis      Daily Calorie Needs: Recommend to take into account any fluid losses and calories burned via increased activity levels as daily calories may need to be adjusted     Weight check: Weights can fluctuate depending on fluid shifts vs what foods are consumed prior to checking your weight          Return in about 3 months (around 6/12/2025) for  "followup, body composition .     Most recent notes, labs and previous medical records were reviewed. Total time with chart review and with the patient: 35 min      ______________________________________________________________________        Subjective:     Chief Complaint   Patient presents with    Follow-up     Mwm f/u 12w: waist: 32in       HPI: 35 y.o. female presents for follow-up    Current Medication: Zepbound 5    Wt Loss History:   Previous Medications:  Previously was on Topamax, Saxenda and Wegovy     Previous Regimen:  Breakfast-banana, egg, lunch- leftovers, chicken breast salmon, pork chops, rice, vegetables      Dietary Regimen:  Breakfast: oatmeal protein 12g   Lunch: Salad with chicken   Dinner: Chicken Rice               Snacks: Banana, cucumbers     Fluids: Water: Full gallon                    Physical Activity intake :   Morning strength training five times a week     Social hx:  Occupation: Psychiatrist- Billing       Review Of Systems:  General: No pallor, no weakness   Pulmonary: Negative for shortness of breath  Chest: negative for chest pain  Gastrointestinal:  Negative for abdominal pain, diarrhea   Psychiatric/Behavioral:  Negative for behavioral problems, confusion, dysphoric mood and hallucinations.    All other systems reviewed and are negative.     Objective:  /66   Pulse 93   Temp 97.8 °F (36.6 °C)   Resp 16   Ht 5' 2\" (1.575 m)   Wt 59.5 kg (131 lb 3.2 oz)   LMP 02/27/2025 (Exact Date)   BMI 24.00 kg/m²     Wt Readings from Last 30 Encounters:   03/24/25 59.5 kg (131 lb 3.2 oz)   03/14/25 60.7 kg (133 lb 14.4 oz)   12/30/24 63.7 kg (140 lb 6.4 oz)   12/17/24 58.5 kg (129 lb)   12/03/24 58.5 kg (129 lb)   11/20/24 58.7 kg (129 lb 6.4 oz)   11/11/24 59.9 kg (132 lb)   11/06/24 60.1 kg (132 lb 9.6 oz)   11/05/24 61.7 kg (136 lb)   10/29/24 61.7 kg (136 lb)   10/18/24 61.7 kg (136 lb)   08/14/24 61.9 kg (136 lb 6.4 oz)   04/30/24 71.2 kg (157 lb)   04/08/24 72.1 kg (159 lb) "   02/08/24 72.1 kg (159 lb)   01/22/24 72.9 kg (160 lb 12.8 oz)   11/29/23 73.9 kg (163 lb)   11/16/23 73.9 kg (163 lb)   10/10/23 72.6 kg (160 lb)   09/25/23 72.6 kg (160 lb)   08/18/23 74.4 kg (164 lb)   05/11/23 74.4 kg (164 lb)   03/24/23 74.5 kg (164 lb 3.2 oz)   01/20/23 74.6 kg (164 lb 8 oz)   01/20/23 74.6 kg (164 lb 8 oz)   11/02/22 73.6 kg (162 lb 3.2 oz)   07/15/22 72.6 kg (160 lb)   05/18/22 71.8 kg (158 lb 6.4 oz)   05/06/22 71.2 kg (157 lb)   04/29/22 72.4 kg (159 lb 9.6 oz)       Physical Exam  Constitutional:       General: No acute distress.  Well-nourished  HENT:      Head: Normocephalic and atraumatic.   Eyes:      Extraocular Movements: Extraocular movements intact.      Conjunctiva/pupils: Conjunctivae normal. Pupils are equal, round  Pulmonary:      Effort: Pulmonary effort is normal. No labored breathing   Neurological:      General: No focal deficit present.  AO x 3     Mental Status: Alert and oriented to person, place, and time. Mental status is at baseline.   Psychiatric:         Mood and Affect: Mood normal.         Behavior: Behavior normal.     Labs and Imaging  Recent labs and imaging have been personally reviewed.

## 2025-03-24 ENCOUNTER — OFFICE VISIT (OUTPATIENT)
Dept: BARIATRICS | Facility: CLINIC | Age: 35
End: 2025-03-24
Payer: COMMERCIAL

## 2025-03-24 VITALS
DIASTOLIC BLOOD PRESSURE: 66 MMHG | HEIGHT: 62 IN | BODY MASS INDEX: 24.14 KG/M2 | SYSTOLIC BLOOD PRESSURE: 122 MMHG | TEMPERATURE: 97.8 F | WEIGHT: 131.2 LBS | HEART RATE: 93 BPM | RESPIRATION RATE: 16 BRPM

## 2025-03-24 DIAGNOSIS — E28.2 POLYCYSTIC OVARIAN SYNDROME: ICD-10-CM

## 2025-03-24 DIAGNOSIS — E66.811 OBESITY, CLASS I, BMI 30-34.9: Primary | ICD-10-CM

## 2025-03-24 PROCEDURE — 99214 OFFICE O/P EST MOD 30 MIN: CPT | Performed by: STUDENT IN AN ORGANIZED HEALTH CARE EDUCATION/TRAINING PROGRAM

## 2025-03-24 RX ORDER — TIRZEPATIDE 5 MG/.5ML
5 INJECTION, SOLUTION SUBCUTANEOUS WEEKLY
Qty: 2 ML | Refills: 4 | Status: SHIPPED | OUTPATIENT
Start: 2025-03-24

## 2025-03-24 RX ORDER — TIRZEPATIDE 5 MG/.5ML
5 INJECTION, SOLUTION SUBCUTANEOUS WEEKLY
Qty: 2 ML | Refills: 4 | Status: SHIPPED | OUTPATIENT
Start: 2025-03-24 | End: 2025-03-24

## 2025-05-30 ENCOUNTER — TELEPHONE (OUTPATIENT)
Dept: BARIATRICS | Facility: CLINIC | Age: 35
End: 2025-05-30

## 2025-05-30 NOTE — TELEPHONE ENCOUNTER
PA for zepbound 5mg[facility]ITTED to [facility]    via    []M-KEY: [id number])  []Surescripts-Case ID #   []Availity-Auth ID # NDC #   []Faxed to plan   []Other website   []Phone call Case ID #     []PA sent as URGENT    All office notes, labs and other pertaining documents and studies sent. Clinical questions answered. Awaiting determination from insurance company.     Turnaround time for your insurance to make a decision on your Prior Authorization can take 7-21 business days.

## 2025-06-03 NOTE — TELEPHONE ENCOUNTER
PA for zepbound 5mg APPROVED     Date(s) approved 05/30/2025-05/30/2026    Case #    Patient advised by          [x][place] Message  []Phone call   []LMOM  []L/M to call office as no active Communication consent on file  []Unable to leave detailed message as VM not approved on Communication consent       Pharmacy advised by    [x]Fax  []Phone call  []Secure Chat    Specialty Pharmacy    []     Approval letter scanned into Media Yes

## 2025-06-12 DIAGNOSIS — E28.2 POLYCYSTIC OVARIAN SYNDROME: Primary | ICD-10-CM

## 2025-06-12 RX ORDER — DROSPIRENONE AND ETHINYL ESTRADIOL 0.03MG-3MG
1 KIT ORAL DAILY
Qty: 90 TABLET | Refills: 3 | Status: SHIPPED | OUTPATIENT
Start: 2025-06-12

## 2025-06-24 ENCOUNTER — OFFICE VISIT (OUTPATIENT)
Dept: BARIATRICS | Facility: CLINIC | Age: 35
End: 2025-06-24
Payer: COMMERCIAL

## 2025-06-24 ENCOUNTER — CLINICAL SUPPORT (OUTPATIENT)
Dept: BARIATRICS | Facility: CLINIC | Age: 35
End: 2025-06-24

## 2025-06-24 VITALS
RESPIRATION RATE: 16 BRPM | DIASTOLIC BLOOD PRESSURE: 72 MMHG | TEMPERATURE: 98.6 F | SYSTOLIC BLOOD PRESSURE: 116 MMHG | WEIGHT: 128.8 LBS | HEART RATE: 81 BPM | BODY MASS INDEX: 23.7 KG/M2 | HEIGHT: 62 IN

## 2025-06-24 VITALS — BODY MASS INDEX: 23.7 KG/M2 | HEIGHT: 62 IN | WEIGHT: 128.8 LBS

## 2025-06-24 DIAGNOSIS — E66.811 OBESITY, CLASS I, BMI 30-34.9: ICD-10-CM

## 2025-06-24 DIAGNOSIS — E66.3 OVERWEIGHT: ICD-10-CM

## 2025-06-24 DIAGNOSIS — R63.5 ABNORMAL WEIGHT GAIN: Primary | ICD-10-CM

## 2025-06-24 DIAGNOSIS — E28.2 POLYCYSTIC OVARIAN SYNDROME: Primary | ICD-10-CM

## 2025-06-24 PROCEDURE — WEIGHT

## 2025-06-24 PROCEDURE — RECHECK

## 2025-06-24 PROCEDURE — 99214 OFFICE O/P EST MOD 30 MIN: CPT | Performed by: STUDENT IN AN ORGANIZED HEALTH CARE EDUCATION/TRAINING PROGRAM

## 2025-06-24 RX ORDER — TIRZEPATIDE 5 MG/.5ML
5 INJECTION, SOLUTION SUBCUTANEOUS WEEKLY
Qty: 2 ML | Refills: 4 | Status: SHIPPED | OUTPATIENT
Start: 2025-06-24

## 2025-06-24 NOTE — PROGRESS NOTES
Assessment & Plan  BMI 24    Initial weight: 164  Previous weight: 130  Current weight:128    TBW loss%: 21.9     Medication: Continue Zepbound 5       Dietary Recommendations:  Recommend to avoid skipping any meals. Adequate amount of Macronutrients (minimal 3 meals a day) is necessary to help improve metabolism, satiety and allow for better portion control by decreasing Ghrelin (hunger hormone). Lack of hunger can be suppressed by a hormone called Leptin (full hormone) which can occur from a previous meal or caffeine intake    Protein intake throughout the day can help promote satiety and is necessary for muscle growth/repair    Carbohydrates are essential as it is the vital source of fuel for daily activities. energy, cell function, nutrient absorption, and hormone production.     Fats: Essential vitamins like A, D, and E, support cell growth, function and are necessary for nutrient absorption to support your organs     Fluid intake which is at least half your body weight in ounces is necessary to help control cravings (decreasing confusion for appetite vs water deprivation) as the human body is made up of 50-70% of Fluids. If there is a diversion for water alone, would recommend flavored water (example-splash of lemonade or ice tea) to help promote compliance. Fluids include Teas, water, flavored water, seltzer water, coffee, shakes    Metabolism:    Metabolism can also be promoted by macronutrient intake and increased muscle weight via thermogenesis      Daily Calorie Needs: Recommend to take into account any fluid losses and calories burned via increased activity levels as daily calories may need to be adjusted     Weight check: Weights can fluctuate depending on fluid shifts vs what foods are consumed prior to checking your weight          Return in about 17 weeks (around 10/21/2025) for followup .     Most recent notes, labs and previous medical records were reviewed. Total time with chart review and with  "the patient: 35 min      ______________________________________________________________________        Subjective:     Chief Complaint   Patient presents with    Follow-up     Mwm 3m f/u: waist: 31in       HPI: 35 y.o. 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    All other systems reviewed and are negative.     Objective:  /72   Pulse 81   Temp 98.6 °F (37 °C)   Resp 16   Ht 5' 2\" (1.575 m)   Wt 58.4 kg (128 lb 12.8 oz)   LMP 06/18/2025 (Exact Date)   BMI 23.56 kg/m²     Wt Readings from Last 30 Encounters:   06/24/25 58.4 kg (128 lb 12.8 oz)   06/24/25 58.4 kg (128 lb 12.8 oz)   03/24/25 59.5 kg (131 lb 3.2 oz)   03/14/25 60.7 kg (133 lb 14.4 oz)   12/30/24 63.7 kg (140 lb 6.4 oz)   12/17/24 58.5 kg (129 lb)   12/03/24 58.5 kg (129 lb)   11/20/24 58.7 kg (129 lb 6.4 oz)   11/11/24 59.9 kg (132 lb)   11/06/24 60.1 kg (132 lb 9.6 oz)   11/05/24 61.7 kg (136 lb)   10/29/24 61.7 kg (136 lb)   10/18/24 61.7 kg (136 lb)   08/14/24 61.9 kg (136 lb 6.4 oz)   04/30/24 71.2 kg (157 lb)   04/08/24 72.1 kg (159 lb)   02/08/24 72.1 kg (159 lb)   01/22/24 72.9 kg (160 lb 12.8 oz)   11/29/23 73.9 kg (163 lb)   11/16/23 73.9 kg (163 lb)   10/10/23 72.6 kg " (160 lb)   09/25/23 72.6 kg (160 lb)   08/18/23 74.4 kg (164 lb)   05/11/23 74.4 kg (164 lb)   03/24/23 74.5 kg (164 lb 3.2 oz)   01/20/23 74.6 kg (164 lb 8 oz)   01/20/23 74.6 kg (164 lb 8 oz)   11/02/22 73.6 kg (162 lb 3.2 oz)   07/15/22 72.6 kg (160 lb)   05/18/22 71.8 kg (158 lb 6.4 oz)       Physical Exam  Constitutional:       General: No acute distress.  Well-nourished  HENT:      Head: Normocephalic and atraumatic.   Eyes:      Extraocular Movements: Extraocular movements intact.      Conjunctiva/pupils: Conjunctivae normal. Pupils are equal, round  Pulmonary:      Effort: Pulmonary effort is normal. No labored breathing   Neurological:      General: No focal deficit present.  AO x 3     Mental Status: Alert and oriented to person, place, and time. Mental status is at baseline.   Psychiatric:         Mood and Affect: Mood normal.         Behavior: Behavior normal.     Labs and Imaging  Recent labs and imaging have been personally reviewed.

## 2025-07-21 ENCOUNTER — TELEPHONE (OUTPATIENT)
Dept: ENDOCRINOLOGY | Facility: CLINIC | Age: 35
End: 2025-07-21

## 2025-07-21 NOTE — TELEPHONE ENCOUNTER
Phone call from patient to reschedule appointment cancelled by Provider. Offered first available in August. However, patient unable to take offered appointment.     Patient agreeable to virtual appointment as noted in Chart. However, attempted to transfer patient to [facility] [place] for assistance with virtual appointment (not avail). Please contact patient with appointment.

## 2025-07-21 NOTE — TELEPHONE ENCOUNTER
Provider is out sick today and I left a message on pts voicemail that appt needs to be cancelled today due to Provider out sick and to call our office back to reschedule and that we have Virtual visits available and that patient would have to do her labs before next appt labs in system from 11/6/24 and we have virtual visits available beginning tues July 29 2025 5 pm

## 2025-08-07 DIAGNOSIS — E28.2 POLYCYSTIC OVARIAN SYNDROME: ICD-10-CM

## 2025-08-07 DIAGNOSIS — E66.811 OBESITY, CLASS I, BMI 30-34.9: ICD-10-CM

## 2025-08-08 ENCOUNTER — TELEPHONE (OUTPATIENT)
Age: 35
End: 2025-08-08

## 2025-08-08 RX ORDER — TIRZEPATIDE 5 MG/.5ML
5 INJECTION, SOLUTION SUBCUTANEOUS WEEKLY
Qty: 2 ML | Refills: 0 | Status: SHIPPED | OUTPATIENT
Start: 2025-08-08

## 2025-08-12 ENCOUNTER — TELEMEDICINE (OUTPATIENT)
Dept: ENDOCRINOLOGY | Facility: CLINIC | Age: 35
End: 2025-08-12
Payer: COMMERCIAL

## 2025-08-13 ENCOUNTER — TELEPHONE (OUTPATIENT)
Dept: ENDOCRINOLOGY | Facility: CLINIC | Age: 35
End: 2025-08-13

## 2025-08-18 ENCOUNTER — TELEPHONE (OUTPATIENT)
Age: 35
End: 2025-08-18

## (undated) DEVICE — ELECTRODE BLADE MOD E-Z CLEAN 2.5IN 6.4CM -0012M

## (undated) DEVICE — BULB SYRINGE,IRRIGATION WITH PROTECTIVE CAP: Brand: DOVER

## (undated) DEVICE — SPONGE,TONSIL,DBL STRNG,XRAY,SM,7/8",ST: Brand: MEDLINE INDUSTRIES, INC.

## (undated) DEVICE — CATH URET 12FR RED RUBBER

## (undated) DEVICE — MEDI-VAC YANK SUCT HNDL W/TPRD BULBOUS TIP: Brand: CARDINAL HEALTH

## (undated) DEVICE — PENCIL ELECTROSURG E-Z CLEAN -0035H

## (undated) DEVICE — STERILE BETHLEHEM T AND A PACK: Brand: CARDINAL HEALTH

## (undated) DEVICE — SUCTION BOVIE ENT

## (undated) DEVICE — ANTI-FOG SOLUTION WITH FOAM PAD: Brand: DEVON

## (undated) DEVICE — GLOVE SRG BIOGEL 7.5

## (undated) DEVICE — TUBING SUCTION 5MM X 12 FT